# Patient Record
Sex: FEMALE | Race: WHITE
[De-identification: names, ages, dates, MRNs, and addresses within clinical notes are randomized per-mention and may not be internally consistent; named-entity substitution may affect disease eponyms.]

---

## 2017-04-30 ENCOUNTER — HOSPITAL ENCOUNTER (INPATIENT)
Dept: HOSPITAL 59 - ER | Age: 82
LOS: 3 days | Discharge: HOME HEALTH | DRG: 690 | End: 2017-05-03
Attending: FAMILY MEDICINE | Admitting: FAMILY MEDICINE
Payer: MEDICARE

## 2017-04-30 DIAGNOSIS — R53.1: ICD-10-CM

## 2017-04-30 DIAGNOSIS — N39.0: Primary | ICD-10-CM

## 2017-04-30 DIAGNOSIS — Z85.038: ICD-10-CM

## 2017-04-30 DIAGNOSIS — Z85.01: ICD-10-CM

## 2017-04-30 DIAGNOSIS — S00.469A: ICD-10-CM

## 2017-04-30 DIAGNOSIS — E86.0: ICD-10-CM

## 2017-04-30 DIAGNOSIS — Z78.9: ICD-10-CM

## 2017-04-30 LAB
ALBUMIN SERPL-MCNC: 3.8 GM/DL (ref 3.5–5)
ALBUMIN/GLOB SERPL: 1 {RATIO} (ref 1.1–1.8)
ALP SERPL-CCNC: 147 U/L (ref 38–126)
ALT SERPL-CCNC: 45 U/L (ref 9–52)
ANION GAP SERPL CALC-SCNC: 14.9 MMOL/L (ref 7–16)
APPEARANCE UR: (no result)
AST SERPL-CCNC: 98 U/L (ref 14–36)
BACTERIA #/AREA URNS HPF: (no result) /[HPF]
BASOPHILS NFR BLD: 0.1 % (ref 0–6)
BILIRUB SERPL-MCNC: 1.02 MG/DL (ref 0.2–1.3)
BILIRUB UR-MCNC: NEGATIVE MG/DL
BUN SERPL-MCNC: 31 MG/DL (ref 7–17)
CO2 SERPL-SCNC: 24.1 MMOL/L (ref 22–30)
COLOR UR: YELLOW
CREAT SERPL-MCNC: 1.3 MG/DL (ref 0.52–1.04)
EOSINOPHIL NFR BLD: 0.6 % (ref 0–6)
EPI CELLS #/AREA URNS HPF: (no result) /[HPF]
ERYTHROCYTE [DISTWIDTH] IN BLOOD BY AUTOMATED COUNT: 13.8 % (ref 11.5–14.5)
EST GLOMERULAR FILTRATION RATE: 42 ML/MIN
GLOBULIN SER-MCNC: 4 GM/DL (ref 1.4–4.8)
GLUCOSE SERPL-MCNC: 129 MG/DL (ref 70–110)
GLUCOSE UR STRIP-MCNC: NEGATIVE MG/DL
GRANULOCYTES NFR BLD: 73.3 % (ref 47–80)
HCT VFR BLD CALC: 38.2 % (ref 35–47)
HGB BLD-MCNC: 12.1 GM/DL (ref 11.6–16)
KETONES UR QL STRIP: NEGATIVE
LYMPHOCYTES NFR BLD AUTO: 12.9 % (ref 16–45)
MCH RBC QN AUTO: 30 PG (ref 27–33)
MCHC RBC AUTO-ENTMCNC: 31.7 G/DL (ref 32–36)
MCV RBC AUTO: 94.6 FL (ref 81–97)
MONOCYTES NFR BLD: 13.1 % (ref 0–9)
NITRITE UR QL STRIP: NEGATIVE
PLATELET # BLD: 374 K/UL (ref 130–400)
PMV BLD AUTO: 8.9 FL (ref 7.4–10.4)
PROT SERPL-MCNC: 7.8 GM/DL (ref 6.3–8.2)
RBC # BLD AUTO: 4.04 M/UL (ref 3.8–5.4)
RBC # UR STRIP: (no result) /UL
RBC #/AREA URNS HPF: >50 /[HPF]
TSH SERPL-ACNC: 4.4 UIU/ML (ref 0.47–4.68)
URINE LEUKOCYTE ESTERASE: (no result)
UROBILINOGEN UR STRIP-ACNC: 1 E.U./DL (ref 0.2–1)
WBC # BLD AUTO: 9 K/UL (ref 4.2–12.2)
WBC #/AREA URNS HPF: >50 /[HPF]

## 2017-04-30 PROCEDURE — 93010 ELECTROCARDIOGRAM REPORT: CPT

## 2017-04-30 PROCEDURE — 70450 CT HEAD/BRAIN W/O DYE: CPT

## 2017-04-30 PROCEDURE — 97535 SELF CARE MNGMENT TRAINING: CPT

## 2017-04-30 PROCEDURE — 85651 RBC SED RATE NONAUTOMATED: CPT

## 2017-04-30 PROCEDURE — 97165 OT EVAL LOW COMPLEX 30 MIN: CPT

## 2017-04-30 PROCEDURE — 80162 ASSAY OF DIGOXIN TOTAL: CPT

## 2017-04-30 PROCEDURE — 81001 URINALYSIS AUTO W/SCOPE: CPT

## 2017-04-30 PROCEDURE — 99223 1ST HOSP IP/OBS HIGH 75: CPT

## 2017-04-30 PROCEDURE — 99233 SBSQ HOSP IP/OBS HIGH 50: CPT

## 2017-04-30 PROCEDURE — 84443 ASSAY THYROID STIM HORMONE: CPT

## 2017-04-30 PROCEDURE — 71020: CPT

## 2017-04-30 PROCEDURE — 96366 THER/PROPH/DIAG IV INF ADDON: CPT

## 2017-04-30 PROCEDURE — 82306 VITAMIN D 25 HYDROXY: CPT

## 2017-04-30 PROCEDURE — 96365 THER/PROPH/DIAG IV INF INIT: CPT

## 2017-04-30 PROCEDURE — 86140 C-REACTIVE PROTEIN: CPT

## 2017-04-30 PROCEDURE — 99285 EMERGENCY DEPT VISIT HI MDM: CPT

## 2017-04-30 PROCEDURE — 99239 HOSP IP/OBS DSCHRG MGMT >30: CPT

## 2017-04-30 PROCEDURE — 97530 THERAPEUTIC ACTIVITIES: CPT

## 2017-04-30 PROCEDURE — 93005 ELECTROCARDIOGRAM TRACING: CPT

## 2017-04-30 PROCEDURE — 85025 COMPLETE CBC W/AUTO DIFF WBC: CPT

## 2017-04-30 PROCEDURE — 80053 COMPREHEN METABOLIC PANEL: CPT

## 2017-04-30 NOTE — EMERGENCY DEPARTMENT RECORD
History of Present Illness





- General


Chief complaint: Weakness


Stated complaint: WEAK


Time Seen by Provider: 17 17:15


Source: Patient, Family, EMS


Mode of Arrival: EMS


Limitations: No limitations





- History of Present Illness


Initial comments: 


80 yo female presents with weakness for about two weeks since the death of her 

sister.  She has been very fatigued, sleepy, less appetite, generally feeling 

slowed and tired.  No headaches.  She did get lightheaded today with standing.  

No vomiting or diarrhea.  No chest pain or shortness of breath.  No abdominal 

pain.  No swelling or edema.  No rash.  No speech changes.  No focal weakness.  

No new medications.  She did see her doctor recently and was diagnosed with 

vitamin D deficiency.  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





MD Complaint: Generalized weakness


Onset/Timin


-: Hour(s)


Location: Generalized


Consistency: Constant


Improves with: None


Worsens with: Exertion


Context: Depression (sister  2 weeks ago)


Associated Symptoms: Denies other symptoms





- Brian Coma Scale


Eye Response: (4) Open spontaneously


Motor Response: (6) Obeys commands


Verbal Response: (5) Oriented


[place] Total: 15





- Related Data


 Home Medications











 Medication  Instructions  Recorded  Confirmed  Last Taken


 


Atenolol [Tenormin] 25 mg PO DAILY 17


 


Atenolol [Tenormin] 50 mg PO DAILY 17


 


Digoxin [Lanoxin] 125 mcg PO DAILY 17


 


Ergocalciferol (Vitamin D2) 50,000 unit PO WEEKLY 17 Unknown





[Vitamin D2]    


 


Furosemide [Lasix] 20 mg PO DAILY 17


 


Potassium Chloride [K-Tab ER] 10 meq PO DAILY 17


 


Pravastatin Sodium [Pravachol] 10 mg PO DAILY 17











 Allergies











Allergy/AdvReac Type Severity Reaction Status Date / Time


 


cephalexin monohydrate Allergy  HIVES Verified 17 17:16





[From Keflex]     














Travel Screening





- Travel/Exposure Within Last 30 Days


Have you traveled within the last 30 days?: No





- Travel/Exposure Within Last Year


Have you traveled outside the [place] in the last year?: No





- Additonal Travel Details


Have you been exposed to anyone with a communicable illness?: No





- Travel Symptoms


Symptom Screening: None





Review of Systems


Constitutional: Reports: Malaise, Weakness.  Denies: Chills, Fever, Night sweats

, Weight change


Eyes: Denies: Eye discharge, Eye pain, Photophobia, Vision change


ENT: Denies: Congestion, Epistaxis, Throat pain


Respiratory: Denies: Cough, Dyspnea, Hemoptysis, Wheezes


Cardiovascular: Denies: Arrhythmia, Chest pain, Dyspnea on exertion, Edema, 

Palpitations, Syncope


Endocrine: Reports: Fatigue.  Denies: Heat or cold intolerance, Polydipsia, 

Polyuria


Gastrointestinal: Reports: As per HPI.  Denies: Abdominal pain, Diarrhea, Nausea

, Vomiting


Genitourinary: Denies: Dysuria, Frequency, Hematuria, Urgency


Musculoskeletal: Denies: Arthralgia, Back pain, Joint swelling, Myalgia, Neck 

pain


Skin: Denies: Bruising, Change in color, Rash


Neurological: Reports: Weakness.  Denies: Abnormal gait, Confusion, Headache, 

Numbness, Paresthesias, Tingling, Tremors, Vertigo


Psychiatric: Reports: Depression.  Denies: Anxiety


Hematological/Lymphatic: Denies: Blood Clots, Easy bleeding, Easy bruising, 

Swollen glands





Past Medical History





- SOCIAL HISTORY


Smoking Status: Never smoker


Alcohol Use: None


Drug Use: None





- RESPIRATORY


Hx Respiratory Disorders: No





- CARDIOVASCULAR


Hx Cardio Disorders: No





- NEURO


Hx Neuro Disorders: No





- GI


Hx GI Disorders: No





- 


Hx Genitourinary Disorders: No





- ENDOCRINE


Hx Endocrine Disorders: No





- MUSCULOSKELETAL


Hx Musculoskeletal Disorders: No





- PSYCH


Hx Psych Problems: No





- HEMATOLOGY/ONCOLOGY


Hx Hematology/Oncology Disorders: Yes


Hx Cancer: Yes (colon)





Family Medical History


Any Significant Family History?: No





Physical Exam





- General


General Appearance: Alert, Oriented x3, Cooperative, No acute distress


Limitations: No limitations, Other (the patient is the primary historian)





- Head


Head exam: Atraumatic, Normal inspection





- Eye


Eye exam: Normal appearance, PERRL, EOMI.  negative: Conjunctival injection, 

Periorbital swelling





- ENT


ENT exam: Normal exam, Mucous membranes moist, Normal external ear exam, Normal 

orophraynx


Ear exam: Normal external inspection.  negative: External canal tenderness


Nasal Exam: Normal inspection.  negative: Discharge, Sinus tenderness


Mouth exam: Normal external inspection, Tongue normal


Teeth exam: Normal inspection.  negative: Dental caries


Throat exam: Normal inspection.  negative: Tonsillar erythema, Tonsillar exudate





- Neck


Neck exam: Normal inspection, Full ROM.  negative: Lymphadenopathy, Tenderness, 

Thyromegaly





- Respiratory


Respiratory exam: Normal lung sounds bilaterally.  negative: Respiratory 

distress





- Cardiovascular


Cardiovascular Exam: Regular rate, Normal rhythm, Normal heart sounds


Peripheral Pulses: 2+: Radial (R), Radial (L)





- GI/Abdominal


GI/Abdominal exam: Soft





- Rectal


Rectal exam: Deferred





- 


 exam: Deferred





- Extremities


Extremities exam: Normal inspection, Full ROM, Normal capillary refill.  

negative: Tenderness





- Back


Back exam: Reports: Normal inspection, Full ROM.  Denies: Muscle spasm, Rash 

noted, Tenderness





- Neurological


Neurological exam: Alert, CN II-XII intact, Normal gait, Oriented X3.  negative

: Altered, Motor sensory deficit





- Psychiatric


Psychiatric exam: Normal affect, Normal mood.  negative: Agitated, Anxious, 

Depressed





- Skin


Skin exam: Dry, Intact, Normal color, Warm





Course





 Vital Signs











  17





  17:08


 


Temperature 97.7 F


 


Pulse Rate [ 95 H





Pulse Ox Probe] 


 


Respiratory 18





Rate 


 


Blood Pressure 124/64





[Left Arm] 


 


Pulse Ox 98














- Reevaluation(s)


Reevaluation #1: 


No acute findings on the HCT scan


CBC is unremarkable.  No acute changes


EKG NSR, rate 87, intervals normal, axis leftward, ST no acute changes





17 18:16





Reevaluation #2: 


BUn and CR elevated at 31 and 1.3 likely dehydration as she has not been eating 

or drinking normally since her sisters death.


17 18:31








Medical Decision Making





- Lab Data


Result diagrams: 


 17 17:30





 17 06:45





Disposition


Clinical Impression: 


 Dehydration, UTI (urinary tract infection), Generalized weakness


Disposition: Still a Patient at [facility]


Condition: (1) Good

## 2017-04-30 NOTE — EMERGENCY DEPARTMENT RECORD
History of Present Illness





- General


Chief complaint: Weakness


Stated complaint: WEAK


Time Seen by Provider: 17 17:15


Source: Patient, Family, EMS


Mode of Arrival: EMS


Limitations: No limitations, Other (the patient is the primary historian)





- History of Present Illness


MD Complaint: Generalized weakness


Onset/Timin


-: Hour(s)


Location: Generalized


Consistency: Constant


Improves with: None


Worsens with: Exertion


Context: Depression (sister  2 weeks ago)


Associated Symptoms: Denies other symptoms





- Brian Coma Scale


Eye Response: (4) Open spontaneously


Motor Response: (6) Obeys commands


Verbal Response: (5) Oriented


[place] Total: 15





- Related Data


 Home Medications











 Medication  Instructions  Recorded  Confirmed  Last Taken


 


Atenolol [Tenormin] 25 mg PO DAILY 17


 


Atenolol [Tenormin] 50 mg PO DAILY 17


 


Digoxin [Lanoxin] 125 mcg PO DAILY 17


 


Ergocalciferol (Vitamin D2) 50,000 unit PO WEEKLY 17 Unknown





[Vitamin D2]    


 


Furosemide [Lasix] 20 mg PO DAILY 17


 


Potassium Chloride [K-Tab ER] 10 meq PO DAILY 17


 


Pravastatin Sodium [Pravachol] 10 mg PO DAILY 17











 Allergies











Allergy/AdvReac Type Severity Reaction Status Date / Time


 


cephalexin monohydrate Allergy  HIVES Verified 17 17:16





[From Keflex]     














Travel Screening





- Travel/Exposure Within Last 30 Days


Have you traveled within the last 30 days?: No





- Travel/Exposure Within Last Year


Have you traveled outside the [place] in the last year?: No





- Additonal Travel Details


Have you been exposed to anyone with a communicable illness?: No





- Travel Symptoms


Symptom Screening: None





Review of Systems


Constitutional: Reports: Malaise, Weakness.  Denies: Chills, Fever, Night sweats

, Weight change


Eyes: Denies: Eye discharge, Eye pain, Photophobia, Vision change


ENT: Denies: Congestion, Epistaxis, Throat pain


Respiratory: Denies: Cough, Dyspnea, Hemoptysis, Wheezes


Cardiovascular: Denies: Arrhythmia, Chest pain, Dyspnea on exertion, Edema, 

Palpitations, Syncope


Endocrine: Reports: Fatigue.  Denies: Heat or cold intolerance, Polydipsia, 

Polyuria


Gastrointestinal: Reports: As per HPI.  Denies: Abdominal pain, Diarrhea, Nausea

, Vomiting


Genitourinary: Denies: Dysuria, Frequency, Hematuria, Urgency


Musculoskeletal: Denies: Arthralgia, Back pain, Joint swelling, Myalgia, Neck 

pain


Skin: Denies: Bruising, Change in color, Rash


Neurological: Reports: Weakness.  Denies: Abnormal gait, Confusion, Headache, 

Numbness, Paresthesias, Tingling, Tremors, Vertigo


Psychiatric: Reports: Depression.  Denies: Anxiety


Hematological/Lymphatic: Denies: Blood Clots, Easy bleeding, Easy bruising, 

Swollen glands





Past Medical History





- SOCIAL HISTORY


Smoking Status: Never smoker


Alcohol Use: None


Drug Use: None





- RESPIRATORY


Hx Respiratory Disorders: No





- CARDIOVASCULAR


Hx Cardio Disorders: No





- NEURO


Hx Neuro Disorders: No





- GI


Hx GI Disorders: No





- 


Hx Genitourinary Disorders: No





- ENDOCRINE


Hx Endocrine Disorders: No





- MUSCULOSKELETAL


Hx Musculoskeletal Disorders: No





- PSYCH


Hx Psych Problems: No





- HEMATOLOGY/ONCOLOGY


Hx Hematology/Oncology Disorders: Yes


Hx Cancer: Yes (colon)





Family Medical History


Any Significant Family History?: No





Physical Exam





- General


Limitations: No limitations, Other (the patient is the primary historian)





Course





 Vital Signs











  17





  17:08 18:15


 


Temperature 97.7 F 


 


Pulse Rate [ 95 H 89





Pulse Ox Probe]  


 


Respiratory 18 16





Rate  


 


Blood Pressure 124/64 120/51





[Left Arm]  


 


Pulse Ox 98 98














- Reevaluation(s)


Reevaluation #1: 





17 20:58


UA reviewed and appears c/w UTI.  Rocephin ordered and has infused, attempted 

ambulation trial and patient ambulated with assist to the bathroom from Room #

4.  Patient and daughter however are not comfortable with the patient going 

home at this time, will admit for observation, IVFs, and antibiotics with 

consultation for PT/OT evaluation in the morning.  Case was discussed with 

Admitting PA (Yamile), will accept admission at this time.





Medical Decision Making





- Lab Data


Result diagrams: 


 17 17:30





 17 17:30





 Lab Results











  17 Range/Units





  17:30 17:30 17:30 


 


WBC  9.0    (4.2-12.2)  K/uL


 


RBC  4.04    (3.80-5.40)  M/uL


 


Hgb  12.1    (11.6-16.0)  gm/dl


 


Hct  38.2    (35.0-47.0)  %


 


MCV  94.6    (81-97)  fl


 


MCH  30.0    (27-33)  pg


 


MCHC  31.7 L    (32-36)  g/dl


 


RDW  13.8    (11.5-14.5)  %


 


Plt Count  374    (130-400)  K/uL


 


MPV  8.9    (7.4-10.4)  fl


 


Gran %  73.3    (47-80)  %


 


Lymphocytes %  12.9 L    (16-45)  %


 


Monocytes %  13.1 H    (0-9)  %


 


Eosinophils %  0.6    (0-6)  %


 


Basophils %  0.1    (0-6)  %


 


Sodium   135 L   (136-145)  mmol/L


 


Potassium   3.9   (3.5-5.1)  mmol/L


 


Chloride   96 L   ()  mmol/L


 


Carbon Dioxide   24.1   (22-30)  mmol/L


 


Anion Gap   14.9   (7-16)  


 


BUN   31 H   (7-17)  mg/dL


 


Creatinine   1.3 H   (0.52-1.04)  mg/dL


 


Estimated GFR   42   ml/min


 


Random Glucose   129 H   ()  mg/dL


 


Calcium   8.7   (8.5-10.1)  mg/dL


 


Total Bilirubin   1.02   (0.2-1.3)  mg/dL


 


AST   98 H   (14-36)  U/L


 


ALT   45   (9-52)  U/L


 


Alkaline Phosphatase   147 H   ()  U/L


 


Total Protein   7.8   (6.3-8.2)  gm/dL


 


Albumin   3.8   (3.5-5.0)  gm/dL


 


Globulin   4.0   (1.4-4.8)  gm/dL


 


Albumin/Globulin Ratio   1.0 L   (1.1-1.8)  


 


TSH   4.40   (0.465-4.68)  uIU/ml


 


Urine Color     


 


Urine Appearance     


 


Urine pH     (5.0-8.0)  


 


Ur Specific Gravity     (1.002-1.030)  


 


Urine Protein     (NEGATIVE)  


 


Urine Glucose (UA)     (NEGATIVE)  


 


Urine Ketones     (NEGATIVE)  


 


Urine Blood     (NEGATIVE)  


 


Urine Nitrite     (NEGATIVE)  


 


Urine Bilirubin     (NEGATIVE)  


 


Urine Urobilinogen     (0.20 - 1.00)  E.U./dL


 


Ur Leukocyte Esterase     (NEGATIVE)  


 


Urine RBC     (NONE SEEN)  


 


Urine WBC     (0-2/hpf)  


 


Ur Epithelial Cells     (FEW)  


 


Urine Bacteria     


 


Digoxin    < 0.40 L  (0.9-2.0)  ng/mL














  17 Range/Units





  19:00 


 


WBC   (4.2-12.2)  K/uL


 


RBC   (3.80-5.40)  M/uL


 


Hgb   (11.6-16.0)  gm/dl


 


Hct   (35.0-47.0)  %


 


MCV   (81-97)  fl


 


MCH   (27-33)  pg


 


MCHC   (32-36)  g/dl


 


RDW   (11.5-14.5)  %


 


Plt Count   (130-400)  K/uL


 


MPV   (7.4-10.4)  fl


 


Gran %   (47-80)  %


 


Lymphocytes %   (16-45)  %


 


Monocytes %   (0-9)  %


 


Eosinophils %   (0-6)  %


 


Basophils %   (0-6)  %


 


Sodium   (136-145)  mmol/L


 


Potassium   (3.5-5.1)  mmol/L


 


Chloride   ()  mmol/L


 


Carbon Dioxide   (22-30)  mmol/L


 


Anion Gap   (7-16)  


 


BUN   (7-17)  mg/dL


 


Creatinine   (0.52-1.04)  mg/dL


 


Estimated GFR   ml/min


 


Random Glucose   ()  mg/dL


 


Calcium   (8.5-10.1)  mg/dL


 


Total Bilirubin   (0.2-1.3)  mg/dL


 


AST   (14-36)  U/L


 


ALT   (9-52)  U/L


 


Alkaline Phosphatase   ()  U/L


 


Total Protein   (6.3-8.2)  gm/dL


 


Albumin   (3.5-5.0)  gm/dL


 


Globulin   (1.4-4.8)  gm/dL


 


Albumin/Globulin Ratio   (1.1-1.8)  


 


TSH   (0.465-4.68)  uIU/ml


 


Urine Color  Yellow  


 


Urine Appearance  Cloudy  


 


Urine pH  5.5  (5.0-8.0)  


 


Ur Specific Gravity  1.015  (1.002-1.030)  


 


Urine Protein  30 mg/dl H  (NEGATIVE)  


 


Urine Glucose (UA)  Negative  (NEGATIVE)  


 


Urine Ketones  Negative  (NEGATIVE)  


 


Urine Blood  Large H  (NEGATIVE)  


 


Urine Nitrite  Negative  (NEGATIVE)  


 


Urine Bilirubin  Negative  (NEGATIVE)  


 


Urine Urobilinogen  1.0  (0.20 - 1.00)  E.U./dL


 


Ur Leukocyte Esterase  Large H  (NEGATIVE)  


 


Urine RBC  >50  (NONE SEEN)  


 


Urine WBC  >50  (0-2/hpf)  


 


Ur Epithelial Cells  0 - 2  (FEW)  


 


Urine Bacteria  1+  


 


Digoxin   (0.9-2.0)  ng/mL














Disposition


Disposition: Admit


Clinical Impression: 


 Dehydration, Weakness





Urinary Tract Infection


Qualifiers:


 Urinary tract infection type: acute cystitis Hematuria presence: without 

hematuria Qualified Code(s): N30.00 - Acute cystitis without hematuria


Disposition: Still a Patient at [facility]


Decision to Admit: Admit from ER


Decision to Admit Date: 17


Decision to Admit Time: 21:01


Condition: (2) Stable


Instructions:  Dehydration (ED)


Additional Instructions: 


You must stay well hydrated over the next few days


Return if worse, fever, vomiting, not eating or drinking


You will need to call your doctor this week


Forms:  Patient Portal Access


Time of Disposition: 21:01

## 2017-05-01 LAB
ALBUMIN SERPL-MCNC: 3.4 GM/DL (ref 3.5–5)
ALBUMIN/GLOB SERPL: 0.9 {RATIO} (ref 1.1–1.8)
ALP SERPL-CCNC: 143 U/L (ref 38–126)
ALT SERPL-CCNC: 49 U/L (ref 9–52)
ANION GAP SERPL CALC-SCNC: 13 MMOL/L (ref 7–16)
AST SERPL-CCNC: 90 U/L (ref 14–36)
BILIRUB SERPL-MCNC: 0.91 MG/DL (ref 0.2–1.3)
BUN SERPL-MCNC: 23 MG/DL (ref 7–17)
CO2 SERPL-SCNC: 21 MMOL/L (ref 22–30)
CREAT SERPL-MCNC: 1 MG/DL (ref 0.52–1.04)
EST GLOMERULAR FILTRATION RATE: 57 ML/MIN
GLOBULIN SER-MCNC: 3.7 GM/DL (ref 1.4–4.8)
GLUCOSE SERPL-MCNC: 105 MG/DL (ref 70–110)
PROT SERPL-MCNC: 7.1 GM/DL (ref 6.3–8.2)

## 2017-05-01 RX ADMIN — FUROSEMIDE SCH: 20 TABLET ORAL at 11:01

## 2017-05-01 RX ADMIN — SIMVASTATIN SCH MG: 5 TABLET, FILM COATED ORAL at 21:57

## 2017-05-01 RX ADMIN — ATENOLOL SCH: 25 TABLET ORAL at 11:02

## 2017-05-01 RX ADMIN — ATENOLOL SCH: 50 TABLET ORAL at 11:02

## 2017-05-01 RX ADMIN — ENOXAPARIN SODIUM SCH MG: 40 INJECTION SUBCUTANEOUS at 11:02

## 2017-05-01 RX ADMIN — POTASSIUM CHLORIDE SCH MEQ: 750 TABLET, FILM COATED, EXTENDED RELEASE ORAL at 11:01

## 2017-05-01 RX ADMIN — CEFTRIAXONE SCH MLS/HR: 2 INJECTION, POWDER, FOR SOLUTION INTRAMUSCULAR; INTRAVENOUS at 11:02

## 2017-05-01 RX ADMIN — DIGOXIN SCH MCG: 125 TABLET ORAL at 11:01

## 2017-05-01 NOTE — REHAB EVALUATION
Patient Information





- Patient Information


Diagnosis: Dehydration, generalized weakness, UTI


Ordered Treatment: OT Evaluate and Treat


Status: Initial Evaluation


Surgery: No


History: Detail (The patient presented into ED with complaints of weakness for 

approx. 2 weeks.  The patient was admitted onto inpatient floor.)


Past Medical/Surgical Hx: 


 PAST MEDICAL/SURGICAL HISTORY





Past Surgical History            removal of colon ca


                                 





PMH - Respiratory





Hx Respiratory Disorders         No


Comment:                         PATIENT IS POOR HISTORIAN





PMH - Cardiovascular





Hx Cardiovascular Disorders      No


Hx Irregular Heartbeat           Yes: patient reports this was in the distant


                                 past


Comment:                         PATIENT IS POOR HISTORIAN





PMH - Neuro





Hx Neurological Disorders        No


Comment:                         PATIENT IS POOR HISTORIAN





PMH - GI





Hx Gastrointestinal Disorders    No


Comment:                         PATIENT IS POOR HISTORIAN





PMH - 





Hx Genitourinary Disorders       No


Patient Pregnant                 


Comment:                         PATIENT IS POOR HISTORIAN





PMH - Endocrine





Hx Endocrine Disorders           No


Comment:                         PATIENT IS POOR HISTORIAN





PMH - Musculoskeletal





Hx Musculoskeletal Disorders     No


Comment:                         PATIENT IS POOR HISTORIAN





PMH - Psych





Hx Psychiatric Problems          No


Hx Depression                    Yes: since death of sister 2 weeks ago


Comment:                         PATIENT IS POOR HISTORIAN





PMH - Hematology/Oncology





Hx Hematology/Oncology           Yes


Disorders                        


Hx Cancer                        Yes: colon


Hx Radiation Therapy             Yes


Comment:                         PATIENT IS POOR HISTORIAN








Premorbid Status: Detail (Prior to admission the patient was independent with 

ambulation without device, however she did use a standard cane at times.)


Social History: Detail (The patient lives with spouse in a one story home with 

a basement. She generally stays on the first floor.  She has a ramp at the 

entrance.  She has a walk-in shower with a grab bar and normally stands to 

shower.  She has an elevated toilet.  Prior to admission she was Ind with all 

ADLs, and IADLs including driving.  She was not responsible for yard work.)


Precautions: Universal, Fall





- Time With Patient


Total Time Spent With Patient (Min): 30


Treatment Procedures: Detail (OT eval low complexity)





Subjective Information





- Subjective Information


Per Patient





Objective Data





- Pain


Pain Present: No





- Mental Status


Patient Orientation: Person, Place (Pt oriented to [facility], states age as 80, unsure of month and states year as "2016".)





- Visual Perception


Appears within normal limits for therapeutic activities (Pt reports she wears 

glasses most of the time.)





- ROM


Within normal limits (Chao UE AROM WNL.)





- Strength/Tone


Within normal limits (Chao UE MMT 4+/5 throughout.)





- Coordination


Appears within normal limits for therapeutic activities





- Bed Mobility


Independent (Ind with sit to supine.)





- Transfers


Independent





- Balance


Balance Sitting: Good


Balance Standing: Fair





- Sensation


Intact





- Gait


Detail (Pt ambulated in hallway with 2 wheeled walker with CG assist. Pt very 

fatigued after ambulation.)





- ADL's/IADL's


Detail (Pt reports she is able to toilet with supervision from nursing.  She 

donned briefs with min assist to start over left foot.  Additional ADLs not 

formally assessed at this time.)





Therapy Assessment





- Therapy Assessment


Detail (Pt presents with decreased endurance needed for safe and Ind ADLs and 

functional mobility.)





Problem List





- Problem List


Occupational Therapy Problem List: Detail (1.  Decreased Ind with self care 

activities.  2.  Decreased endurance needed for safe and Ind showering/dressing.

)





Goals





- Goals


Occupational Therapy Goals: 1.  Pt will be Ind with showering.  2.  Pt will be 

Ind with total body dressing.  3.  Pt will demonstrate improved endurance 

needed for safe and Ind self cares.





Prognosis





- Prognosis


Good





Plan





- Plan


Occupational Therapy Plan: OT 1-4 times weekly to address self cares, 

functional mobility, endurance and safety to allow safe and Ind return home.

## 2017-05-01 NOTE — REHAB EVALUATION
Patient Information





- Patient Information


Diagnosis: Dehydration, generalized weakness


Ordered Treatment: PT Evaluate and Treat


Status: Initial Evaluation


History: Detail (The patient presented into ED with complaints of weakness for 

aprox. 2 weeks.  The patient was admitted onto inpatient floor.)


Past Medical/Surgical Hx: 


 PAST MEDICAL/SURGICAL HISTORY





Past Surgical History            removal of colon ca


                                 





PMH - Respiratory





Hx Respiratory Disorders         No


Comment:                         PATIENT IS POOR HISTORIAN





PMH - Cardiovascular





Hx Cardiovascular Disorders      No


Hx Irregular Heartbeat           Yes: patient reports this was in the distant


                                 past


Comment:                         PATIENT IS POOR HISTORIAN





PMH - Neuro





Hx Neurological Disorders        No


Comment:                         PATIENT IS POOR HISTORIAN





PMH - GI





Hx Gastrointestinal Disorders    No


Comment:                         PATIENT IS POOR HISTORIAN





PMH - 





Hx Genitourinary Disorders       No


Patient Pregnant                 


Comment:                         PATIENT IS POOR HISTORIAN





PMH - Endocrine





Hx Endocrine Disorders           No


Comment:                         PATIENT IS POOR HISTORIAN





PMH - Musculoskeletal





Hx Musculoskeletal Disorders     No


Comment:                         PATIENT IS POOR HISTORIAN





PMH - Psych





Hx Psychiatric Problems          No


Hx Depression                    Yes: since death of sister 2 weeks ago


Comment:                         PATIENT IS POOR HISTORIAN





PMH - Hematology/Oncology





Hx Hematology/Oncology           Yes


Disorders                        


Hx Cancer                        Yes: colon


Hx Radiation Therapy             Yes


Comment:                         PATIENT IS POOR HISTORIAN








Premorbid Status: Detail (Prior to admission the patient was independent with 

ambulation without device, however she did use a standard cane at times.)


Social History: Detail (The patient lives in a one story home with a basement 

with . The patient's home has a ramp at the enterance and a bathroom 

with a walkin in shower without a chair.  The patient's home has an elevated 

toilet with grab bars.)


Precautions: Universal, Fall





- Time With Patient


Total Time Spent With Patient (Min): 30


Treatment Procedures: Detail (PT Initial Evaluation)





Subjective Information





- Subjective Information


Per Patient (The patient had no complaints of pain but did complain of feeling 

weak.)





Objective Data





- Mental Status


Patient Orientation: Person, Place, Time (The patient identified her birthday 

but not age and did not know month and year.)





- Visual Perception


Appears within normal limits for therapeutic activities





- ROM


Within normal limits (The patient's LE AROM is WNL.  Refer to OT evaluation  

for UE ROM.)





- Strength/Tone


Not within normal limits (The patient's hip flexors were 4/5, hip abductors, 

adductors 4+/5, Quads 4+/5, hamstrings 4/5, ankle musculature 4+/5. Refer to OT 

note for UE strength grades.)





- Bed Mobility


Independent (The patient was independent with supine to and from sit transfer.)





- Transfers


Independent (The patient was independent with sit to and from stand transfer)





- Balance


Balance Sitting: Good


Balance Standing: Fair (The patient's balance using the Tinetti Assessment Tool 

is 19/28 which is in the moderate for risk for falling category.)





- Gait


Detail (The patient ambulated 5 feet with CG of 2 without device. Ambulating 

without device the patient exhibited increased postural sway.  The patient 

ambulated with wheeled walker with Cg/supervision for safety a distance of 65 

feet x 1.)





Therapy Assessment





- Therapy Assessment


Detail (The patient exhibits decreased balance, LE weakness, and unsteadiness 

with gait.  Recommend use of wheeled walker for ambulation.  Feel the patient 

would benefit from inpatient rehab to return to previous functional level. 

Recommend ongoing home health Rehab upon discharge from [facility].)





Problem List





- Problem List


Physical Therapy Problem List: Detail (1) Instability with ambulation 2) 

Decreased LE strength 3) Decreased Balance as measured by the Tinetti 

Assessment Tool 4) Decreased ability to complete sustained physical activity)





Goals





- Goals


Physical Therapy Goals: 1) The patient will be independent with ambulation with 

assistive device household and community distances. 2) Increase LE strength 1/3 

muscle grade  in weakened muscles to increase stability of gait. 3) Improve 

balance 3 to 4 points on the Tinetti Asessment Tool. 4) The patient will 

tolerate 30 minutes of physical activity with one to two period.





Prognosis





- Prognosis


Good





Plan





- Plan


Physical Therapy Plan: PT once daily for gait training, transfer training, LE 

strengthening and balance exercises. Ongoing PT is recommended upon discharge 

from hospital and wheeled walker.

## 2017-05-01 NOTE — CT SCAN REPORT
EXAM:  CT OF THE BRAIN  



HISTORY:  DIZZINESS.  



TECHNIQUE:  CT of the brain without contrast was obtained.  



Comparison:  Prior CT of the brain from 8/09/11.  



FINDINGS:  The globes are intact.  The paranasal sinuses and mastoid air cells 
are unremarkable.  No displaced or depressed skull fracture.  No intra or 
extraaxial hemorrhage.  CT is limited for evaluation of acute infarct.  No CT 
evidence for large or territorial acute infarct.  No mass or midline shift.  
Age appropriate atrophy with small vessel ischemic change.  



IMPRESSION:  

ATROPHY.  SMALL VESSEL ISCHEMIC CHANGE.  



JOB NUMBER:  [id number]
[facility]

## 2017-05-02 RX ADMIN — ATENOLOL SCH: 50 TABLET ORAL at 11:19

## 2017-05-02 RX ADMIN — SIMVASTATIN SCH MG: 5 TABLET, FILM COATED ORAL at 23:07

## 2017-05-02 RX ADMIN — ATENOLOL SCH MG: 25 TABLET ORAL at 10:52

## 2017-05-02 RX ADMIN — VITAMIN D, TAB 1000IU (100/BT) SCH UNIT: 25 TAB at 10:53

## 2017-05-02 RX ADMIN — DIGOXIN SCH MCG: 125 TABLET ORAL at 10:51

## 2017-05-02 RX ADMIN — ENOXAPARIN SODIUM SCH MG: 40 INJECTION SUBCUTANEOUS at 10:52

## 2017-05-02 RX ADMIN — FUROSEMIDE SCH: 20 TABLET ORAL at 11:17

## 2017-05-02 RX ADMIN — POTASSIUM CHLORIDE SCH MEQ: 750 TABLET, FILM COATED, EXTENDED RELEASE ORAL at 10:53

## 2017-05-02 RX ADMIN — ATENOLOL SCH MG: 50 TABLET ORAL at 10:52

## 2017-05-02 RX ADMIN — CEFTRIAXONE SCH MLS/HR: 2 INJECTION, POWDER, FOR SOLUTION INTRAMUSCULAR; INTRAVENOUS at 10:49

## 2017-05-02 RX ADMIN — FUROSEMIDE SCH MG: 20 TABLET ORAL at 10:52

## 2017-05-02 NOTE — PHYSICAL THERAPY TX NOTE
Physical Therapy Tx Note





- Treatment Note


Tolerated: Good


Total Time Spent With Patient: 20


Physical Therapy Tx Note: Detail (The patient was in bed when PT arrived.  The 

patient was fitted for a wheeled walker. The patient ambulated with wheeled 

walker a distance of 40 feet x 1, then to bathroom.  The patient required 

minimal assist to put on new undergarment. The patient returned to bed and was 

independent with supine to and from sit.  Call light was placed within reach.)


Physical Therapy Problem List: Detail (1) Instability with ambulation 2) 

Decreased LE strength 3) Decreased Balance as measured by the Tinetti 

Assessment Tool 4) Decreased ability to complete sustained physical activity)


Physical Therapy Goals: 1) The patient will be independent with ambulation with 

assistive device household and community distances. 2) Increase LE strength 1/3 

muscle grade  in weakened muscles to increase stability of gait. 3) Improve 

balance 3 to 4 points on the Tinetti Asessment Tool. 4) The patient will 

tolerate 30 minutes of physical activity with one to two period.


Physical Therapy Plan: PT once daily for gait training, transfer training, LE 

strengthening and balance exercises. Ongoing PT is recommended upon discharge 

from hospital and wheeled walker.

## 2017-05-02 NOTE — OCCUPATIONAL THERAPY TX NOTE
Occupational Therapy Tx Note





- Treatment Note


Tolerated: Good


Total Time Spent With Patient: 45 (ADL)


Occupational Therapy Treatment Note: Detail (S:  Pt alert and wanting to 

shower.  O:  Supine to sit Indly.  Amb to shower chair with 2 wheeled walker 

and SBA.  Doffed gown, briefs and slippers Indly.  Pt completed showering in 

sitting and standing with set up and using grab bars for balance, roxann with eyes 

closed.  Pt dried self in standing Indly.  Donned gown, slippers and briefs in 

sitting Indly.  Pt amb to sink and completed hair care Indly in standing.  Amb 

to chair with walker and SBA.  Ind with stand to sit.  A:  Ind with showering 

using seat and grab bar for balance.  Ind with partial dressing.  Pt able to 

identify balance deficits and modify technique appropriately.)


Occupational Therapy Problem List: Detail (1.  Decreased Ind with self care 

activities.  2.  Decreased endurance needed for safe and Ind showering/dressing.

)


Occupational Therapy Goals: 1.  Pt will be Ind with showering.  2.  Pt will be 

Ind with total body dressing.  3.  Pt will demonstrate improved endurance 

needed for safe and Ind self cares.


Prognosis: Good


Occupational Therapy Plan: OT 1-4 times weekly to address self cares, 

functional mobility, endurance and safety to allow safe and Ind return home.

## 2017-05-02 NOTE — PHYSICIAN PROGRESS NOTE
Subjective





- Date


Date of Physician Progress Note: 05/02/17





- Subjective


Subjective Comment: 


Patient states she is feeling better today. She has been up with PT/OT today 

and has had significant improvement in her strength. Was able to shower with 

OT. Has needed minimal assistance from nursing to ambulate to the bathroom. Has 

been tolerating clear liquids and appetite has improved somewhat but not back 

to normal. Patient's son states she seems closer to her baseline today. she is 

oriented x3 today. she denies chest pain, abdominal pain, nausea, headache, or 

diarrhea. 





Objective





- Vital Signs


Vital Signs: 





 Vital Signs - Last 24 Hrs











  Temp Pulse Resp BP Pulse Ox


 


 05/01/17 21:00   83  16  


 


 05/01/17 17:00  97.6 F  91 H  16  92/57  98














- General


General Appearance: Alert, Oriented x3, Cooperative, No acute distress


Limitations: No limitations, Other (the patient is the primary historian)





- Head


Head exam: Atraumatic, Normal inspection


Head exam detail: negative: Abrasion, Contusion





- Eye


Eye exam: Normal appearance, PERRL, EOMI.  negative: Conjunctival injection, 

Periorbital swelling





- ENT


ENT exam: Normal exam, Mucous membranes moist, Normal external ear exam, Normal 

orophraynx


Ear exam: Normal external inspection.  negative: External canal tenderness


Nasal Exam: Normal inspection.  negative: Discharge, Sinus tenderness


Mouth exam: Normal external inspection, Tongue normal


Teeth exam: Normal inspection.  negative: Dental caries


Throat exam: Normal inspection.  negative: Tonsillar erythema, Tonsillar exudate





- Neck


Neck exam: Normal inspection, Full ROM.  negative: Lymphadenopathy, Tenderness, 

Thyromegaly





- Respiratory


Respiratory exam: Normal lung sounds bilaterally.  negative: Respiratory 

distress





- Cardiovascular


Cardiovascular Exam: Regular rate, Normal rhythm, Normal heart sounds


Peripheral Pulses: 2+: Radial (R), Radial (L), Dorsalis Pedis (R), Dorsalis 

Pedis (L)





- GI/Abdominal


GI/Abdominal exam: Soft, Normal bowel sounds.  negative: Tenderness





- Rectal


Rectal exam: Deferred





- 


 exam: Deferred





- Extremities


Extremities exam: Normal inspection, Full ROM, Normal capillary refill.  

negative: Tenderness





- Back


Back exam: Reports: Normal inspection (several areas of small sebacious gland 

plugging), Full ROM.  Denies: Muscle spasm, Rash noted, Tenderness





- Neurological


Neurological exam: Alert, CN II-XII intact.  negative: Altered, Motor sensory 

deficit





- Psychiatric


Psychiatric exam: Normal affect, Normal mood.  negative: Agitated, Anxious, 

Depressed





- Skin


Skin exam: Dry, Intact, Normal color, Rash (poor hygeine related dermatitis 

christiano area), Warm


Type of lesion: Other (approx .5 x .5cm area of circular erythema where tick 

was removed behind left ear)





Assessment and Plan





- Assessment and Plan


(1) Generalized weakness


Current Visit: Yes   Status: Acute   Base Code: R53.1 - WEAKNESS


Comment: 5/2/17- Improved. UTI is most likely cause of acute weakness with 

significant improvement with antibiotics.  Lyme's antibody titers still 

pending. urine culture still pending.


- continue IV rescuscitation


- continue Rocephin 2gm Q 24 hours


-continue PT/OT daily. If continues to progress will be candidate for home 

therapy in place of [facility]


-vitals q8H


-repeat labs qam 








(2) UTI (urinary tract infection)


Current Visit: Yes   Status: Acute   Qualifiers: 


     Urinary tract infection type: acute cystitis     Hematuria presence: 

without hematuria     Qualified Code(s): N30.00 - Acute cystitis without 

hematuria     Base Code: N39.0 - URINARY TRACT INFECTION, SITE NOT SPECIFIED


Comment: 5/2/17- UA positive for protein, nitrites, blood and leukocytes. 

Improving clinically with rocephin. culture pending


-continue rocephin 2gm IV q24H








(3) Dehydration


Current Visit: Yes   Status: Acute   Base Code: E86.0 - DEHYDRATION


Comment: 5/2/17- resolving. patient tolerating po fluids.  BUN of 23 and Cr of 

1.0 today.


-continue to encourage po intake


-vitals q8H


-repeat labs qam 








(4) Tick bite of ear


Current Visit: Yes   Status: Acute      Base Code: S00.469A - INSECT BITE (

NONVENOMOUS) OF UNSPECIFIED EAR, INIT ENCNTR; W57.XXXA - BIT/STUNG BY NONVENOM 

INSECT & OTH NONVENOM ARTHROPODS, INIT


Comment: 5/2/17- Tick Successfully removed with head and body intact. specimen 

sent to lab for analysis. Unsure of tick imbedded but does have indoor/outdoor 

cats at home. CRP down from 17 to 9 today. 


-lyme IgG and IgM pending


- Will continue Rocephin 2gm Q 24 hours for Lyme prophylaxis as well as UTI 

treatment


- continue to monitor area behind left ear








(5) Full code status


Current Visit: Yes   Status: Acute   Base Code: Z78.9 - OTHER SPECIFIED HEALTH 

STATUS


Comment: 5/2/17- will remain full code during this hospitalization








(6) DVT prophylaxis


Current Visit: Yes   Status: Acute   Base Code: [id number] - 


Comment: 5/2/17-patient high risk with age and restricted mobility


-Lovenox 40mg QD for prophylaxis











Results





- Labs


Result Diagrams: 


 04/30/17 17:30





 05/01/17 06:45





DVT/PE Assessment





- Risk for VTE


Risk for VTE: No


Risk Level: Moderate


Risk Assessment Date: 05/01/17


Risk Assessment Time: 07:32


VTE Orders Placed or Will Be Placed: Yes





- Active Medicaitons


Current Medications: 





 Current Medications





Acetaminophen (Tylenol 500mg Tab)  1,000 mg PO Q6H PRN


   PRN Reason: PAIN/TEMP


Atenolol (Tenormin)  25 mg PO DAILY [facility]


   Last Admin: 05/01/17 11:02 Dose:  Not Given


Atenolol (Tenormin)  50 mg PO DAILY [facility]


   Last Admin: 05/01/17 11:02 Dose:  Not Given


Digoxin (Lanoxin)  125 mcg PO DAILY [facility]


   Last Admin: 05/01/17 11:01 Dose:  125 mcg


Enoxaparin Sodium (Lovenox)  40 mg SQ DAILY [facility]


   Last Admin: 05/01/17 11:02 Dose:  40 mg


Furosemide (Lasix)  20 mg PO DAILY [facility]


   Last Admin: 05/01/17 11:01 Dose:  Not Given


Sodium Chloride ()  1,000 mls @ 15 mls/hr IV .Q24H PRN


   PRN Reason: LARGE VOLUME IV


Ceftriaxone Sodium 2 gm/ (Sodium Chloride)  100 mls @ 200 mls/hr IVPB Q24H [facility]


   Stop: 05/06/17 10:01


   Last Admin: 05/01/17 11:02 Dose:  200 mls/hr


Potassium Chloride (Klor-Con)  10 meq PO DAILY [facility]


   Last Admin: 05/01/17 11:01 Dose:  10 meq


Simvastatin (Zocor)  5 mg PO QHS [facility]


   Last Admin: 05/01/17 21:57 Dose:  5 mg


Vitamin D (Vitamin D3)  2,000 unit PO DAILY [facility]


Zinc Oxide (Desitin)  28.35 gm TOP BID PRN


   PRN Reason: RASH


   Last Admin: 05/01/17 22:00 Dose:  28.35 gm











AMI Plan





- Labs


Result Diagrams: 


 04/30/17 17:30





 05/01/17 06:45

## 2017-05-02 NOTE — RADIOLOGY REPORT
EXAM:  CHEST, TWO VIEWS



HISTORY:  WEAKNESS.  



TECHNIQUE:  Frontal and lateral views of the chest were obtained.  



Comparison:  None.  



FINDINGS:  Cardiomegaly with atheromatous change of the thoracic aorta.  
Osteopenia.  The lungs are clear.  No pneumothorax.  



IMPRESSION:  

CARDIOMEGALY.  NO ACUTE CARDIOPULMONARY PROCESS.  



JOB NUMBER:  [id number]
[facility]DD

## 2017-05-03 LAB
ALBUMIN SERPL-MCNC: 3.2 GM/DL (ref 3.5–5)
ALBUMIN/GLOB SERPL: 0.9 {RATIO} (ref 1.1–1.8)
ALP SERPL-CCNC: 123 U/L (ref 38–126)
ALT SERPL-CCNC: 46 U/L (ref 9–52)
ANION GAP SERPL CALC-SCNC: 8.6 MMOL/L (ref 7–16)
AST SERPL-CCNC: 55 U/L (ref 14–36)
BASOPHILS NFR BLD: 0.7 % (ref 0–6)
BILIRUB SERPL-MCNC: 0.52 MG/DL (ref 0.2–1.3)
BUN SERPL-MCNC: 17 MG/DL (ref 7–17)
CO2 SERPL-SCNC: 24.4 MMOL/L (ref 22–30)
CREAT SERPL-MCNC: 1 MG/DL (ref 0.52–1.04)
CRP SERPL-MCNC: 6.2 MG/DL (ref 0–0.9)
EOSINOPHIL NFR BLD: 2 % (ref 0–6)
ERYTHROCYTE [DISTWIDTH] IN BLOOD BY AUTOMATED COUNT: 13.9 % (ref 11.5–14.5)
EST GLOMERULAR FILTRATION RATE: 57 ML/MIN
GLOBULIN SER-MCNC: 3.4 GM/DL (ref 1.4–4.8)
GLUCOSE SERPL-MCNC: 98 MG/DL (ref 70–110)
GRANULOCYTES NFR BLD: 65.8 % (ref 47–80)
HCT VFR BLD CALC: 36.2 % (ref 35–47)
HGB BLD-MCNC: 11.7 GM/DL (ref 11.6–16)
LYMPHOCYTES NFR BLD AUTO: 19.4 % (ref 16–45)
MCH RBC QN AUTO: 30.8 PG (ref 27–33)
MCHC RBC AUTO-ENTMCNC: 32.3 G/DL (ref 32–36)
MCV RBC AUTO: 95.3 FL (ref 81–97)
MONOCYTES NFR BLD: 12.1 % (ref 0–9)
PLATELET # BLD: 400 K/UL (ref 130–400)
PMV BLD AUTO: 9 FL (ref 7.4–10.4)
PROT SERPL-MCNC: 6.6 GM/DL (ref 6.3–8.2)
RBC # BLD AUTO: 3.8 M/UL (ref 3.8–5.4)
WBC # BLD AUTO: 5.9 K/UL (ref 4.2–12.2)

## 2017-05-03 RX ADMIN — ATENOLOL SCH: 25 TABLET ORAL at 09:39

## 2017-05-03 RX ADMIN — VITAMIN D, TAB 1000IU (100/BT) SCH UNIT: 25 TAB at 09:38

## 2017-05-03 RX ADMIN — CEFTRIAXONE SCH MLS/HR: 2 INJECTION, POWDER, FOR SOLUTION INTRAMUSCULAR; INTRAVENOUS at 09:39

## 2017-05-03 RX ADMIN — FUROSEMIDE SCH MG: 20 TABLET ORAL at 09:38

## 2017-05-03 RX ADMIN — ATENOLOL SCH: 50 TABLET ORAL at 09:40

## 2017-05-03 RX ADMIN — DIGOXIN SCH MCG: 125 TABLET ORAL at 09:39

## 2017-05-03 RX ADMIN — ENOXAPARIN SODIUM SCH MG: 40 INJECTION SUBCUTANEOUS at 09:39

## 2017-05-03 RX ADMIN — ATENOLOL SCH MG: 25 TABLET ORAL at 13:15

## 2017-05-03 RX ADMIN — POTASSIUM CHLORIDE SCH MEQ: 750 TABLET, FILM COATED, EXTENDED RELEASE ORAL at 09:38

## 2017-05-03 NOTE — DISCHARGE SUMMARY
Providers


Discharge Summary Date: 05/03/17


Date of admission: 


05/01/17 11:36





Expected Date of Discharge: 05/03/17


Attending physician: 


LILLIE EMERY





Primary care physician: 


Pam Morel








Physical Exam





- Vital Signs


Vital Signs: 


 Vital Signs - Last 24 Hrs











  Temp Pulse Resp BP Pulse Ox


 


 05/03/17 09:00  98.4 F  78  16  102/53  98


 


 05/03/17 00:00  97.9 F  79  16  106/59  98


 


 05/02/17 21:00   80   


 


 05/02/17 17:00  97.6 F  80  18  100/56  99














- General


General Appearance: Alert, Oriented x3, Cooperative, No acute distress


Limitations: No limitations, Other (the patient is the primary historian)





- Head


Head exam: Atraumatic, Normal inspection


Head exam detail: negative: Abrasion, Contusion





- Eye


Eye exam: Normal appearance, PERRL, EOMI.  negative: Conjunctival injection, 

Periorbital swelling





- ENT


ENT exam: Normal exam, Mucous membranes moist, Normal external ear exam, Normal 

orophraynx


Ear exam: Normal external inspection.  negative: External canal tenderness


Nasal Exam: Normal inspection.  negative: Discharge, Sinus tenderness


Mouth exam: Normal external inspection, Tongue normal


Teeth exam: Normal inspection.  negative: Dental caries


Throat exam: Normal inspection.  negative: Tonsillar erythema, Tonsillar exudate





- Neck


Neck exam: Normal inspection, Full ROM.  negative: Lymphadenopathy, Tenderness, 

Thyromegaly





- Respiratory


Respiratory exam: Normal lung sounds bilaterally.  negative: Respiratory 

distress





- Cardiovascular


Cardiovascular Exam: Regular rate, Normal rhythm, Normal heart sounds


Peripheral Pulses: 2+: Radial (R), Radial (L), Dorsalis Pedis (R), Dorsalis 

Pedis (L)





- GI/Abdominal


GI/Abdominal exam: Soft, Normal bowel sounds.  negative: Tenderness





- Rectal


Rectal exam: Deferred





- 


 exam: Deferred





- Extremities


Extremities exam: Normal inspection, Full ROM, Normal capillary refill.  

negative: Tenderness





- Back


Back exam: Reports: Normal inspection (several areas of small sebacious gland 

plugging), Full ROM.  Denies: Muscle spasm, Rash noted, Tenderness





- Neurological


Neurological exam: Alert, CN II-XII intact.  negative: Altered, Motor sensory 

deficit





- Psychiatric


Psychiatric exam: Normal affect, Normal mood.  negative: Agitated, Anxious, 

Depressed





- Skin


Skin exam: Dry, Intact, Normal color, Rash (poor hygeine related dermatitis 

christiano area), Warm


Type of lesion: Other (approx .5 x .5cm area of circular erythema where tick 

was removed behind left ear)





Hospitalization





- Hospitalization


Admission Diagnosis: Generalized Weakness.  UTI.  Dehydration





- Problem List/Discharge Diagnosis


(1) Generalized weakness


Current Visit: Yes   Status: Acute   Base Code: R53.1 - WEAKNESS


Comment: 5/3/17- resolved.  UTI is most likely cause of acute weakness with 

significant improvement with antibiotics.  Lyme's antibody titers still 

pending. urine culture still pending. 


-transition to oral antibiotics


-home pt/ot and nursing set up by social work 


-patient has follow up appointment scheduled tomorrow at 2pm wt Dr. Morel








(2) UTI (urinary tract infection)


Current Visit: Yes   Status: Acute   Discharge Diagnosis: 


     Urinary tract infection type: acute cystitis     Hematuria presence: 

without hematuria     Qualified Code(s): N30.00 - Acute cystitis without 

hematuria     Base Code: N39.0 - URINARY TRACT INFECTION, SITE NOT SPECIFIED


Comment: 5/3/17- UA positive for protein, nitrites, blood and leukocytes. 

continues to improve clinically with rocephin. culture pending. CRP down from 

17 to 6.2


-transition to cefdinir 300mg po q12h for 8 more days


-will have lab forward culture results to pcp 


-has follow up with Dr. Morel tomorrow








(3) Dehydration


Current Visit: Yes   Status: Acute   Base Code: E86.0 - DEHYDRATION


Comment: 5/3/17- resolved. patient continues to tolerate po fluids.  BUN of 17 

and Cr of 1.0 today.











(4) Tick bite of ear


Current Visit: Yes   Status: Acute      Base Code: S00.469A - INSECT BITE (

NONVENOMOUS) OF UNSPECIFIED EAR, INIT ENCNTR; W57.XXXA - BIT/STUNG BY NONVENOM 

INSECT & OTH NONVENOM ARTHROPODS, INIT


Comment: 5/3/17- Tick Successfully removed with head and body intact. specimen 

sent to lab for analysis and is still pending. Unsure if tick imbedded but does 

have indoor/outdoor cats at home. CRP down from 17 to 9 today. 


-lyme IgG and IgM pending


-will transition to doxycycline 100mg po q12H for 8 more days for total of 10 

day course for possible lyme disease. will have lab forward results of testing 

to Pcp and may discontinue the doxycycline if no lyme's present. 








(5) Full code status


Current Visit: Yes   Status: Acute   Base Code: Z78.9 - OTHER SPECIFIED HEALTH 

STATUS


Comment: 5/3/17- will remain full code during this hospitalization








(6) DVT prophylaxis


Current Visit: Yes   Status: Acute   Base Code: [id number] - 


Comment: 5/3/17-patient was high risk with age and restricted mobility


-Lovenox 40mg QD for prophylaxis











- Hospitalization Course


Disposition: Home Health Service


Hospital Course: 


82 y/o female with CC weakness x 2 weeks admitted for UTI, mild dehydration and 

weakness.





PMX:colon ca


PSX: none reported








Patient is a poor historian. History given with the assistance of Margi tomas. Prior to arrival patient had been experiencing generalized weakness 

for about 2 weeks since death of sister. Schultz been very fatigued, lethargic, poor 

appetite. Did have episode of dizziness with standing today without syncope. 

Denies vomiting or diarrhea, abdominal pain, chest pain, MORRIS or shortness of 

breath, fever. Denies new medications, changes in speech, focalized weakness or 

numbness. Was recently diagnosed with vitamin D deficiency- unsure of what the 

level was.  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 

there is a component of depression since the death of the patient's sister and 

concerned about how safe the patient and her  are now in the home.











While in ED VSS. CBC was normal, Na 135, BUN 31, Cr 1.3. Responded well to IVF 

x 2 liters, was up and walked to the bathroom from one side of ED to the other 

without complaint of dizziness or syncopal episode. Urine + for protein, 

nitrites, blood and sent for routine culture. Was given Levaquin IVPB in ED. 

Head CT atrophy, small vessel ischemic changes, no acute process. Admitted to 

the floor for IV hydration, IV Levaquin and PT/OT eval for weakness. 











5/1/17- resting comfortably in bed, denies pain, abdominal pain, dizziness. 

Does report feels weak and wants to participate in therapy. Nursing noted a 

tick behind left ear this am. Patient reports she has not been doing any 

gardening waling in the woods but does have 2 outdoor/indoor cats. Was unaware 

she had a tick behind her ear, denies muscle aches or flu-like symptoms outside 

of current weakness, fatigue, poor appetite. Nursing staff successfully removed 

tick with head and body intact, sent to lab for further processing. She lives 

with her  who is elderly but does not require physical assist for ADL or 

care. 





5/2/17- Patient states she is feeling better today. She has been up with PT/OT 

today and has had significant improvement in her strength. Was able to shower 

with OT. Has needed minimal assistance from nursing to ambulate to the 

bathroom. Has been tolerating clear liquids and appetite has improved somewhat 

but not back to normal. Patient's son states she seems closer to her baseline 

today. she is oriented x3 today. she denies chest pain, abdominal pain, nausea, 

headache, or diarrhea. 





5/3/17- Patient continues to improve. she reports strength and function back to 

baseline. PT/OT feel she is ambulating well and able to perform ADL's but do 

recommend home pt/ot to ensure she continues to be independent once home. 

patient agrees with that plan. Her appetite has improved as well. having normal 

BM and urine output. No confusion, headache, vision changes, focal weakness, 

nausea, abdominal pain or diarrhea. 


Abnormal Labs: 


 Abnormal Lab Results











  05/02/17 05/03/17 05/03/17 Range/Units





  06:53 06:50 06:50 


 


Monocytes %    12.1 H  (0-9)  %


 


Calcium     (8.5-10.1)  mg/dL


 


AST     (14-36)  U/L


 


C-Reactive Protein  9.0 H    (0.0-0.9)  mg/dL


 


Albumin     (3.5-5.0)  gm/dL


 


Albumin/Globulin Ratio     (1.1-1.8)  


 


Digoxin   < 0.40 L   (0.9-2.0)  ng/mL














  05/03/17 Range/Units





  06:50 


 


Monocytes %   (0-9)  %


 


Calcium  8.4 L  (8.5-10.1)  mg/dL


 


AST  55 H  (14-36)  U/L


 


C-Reactive Protein  6.2 H  (0.0-0.9)  mg/dL


 


Albumin  3.2 L  (3.5-5.0)  gm/dL


 


Albumin/Globulin Ratio  0.9 L  (1.1-1.8)  


 


Digoxin   (0.9-2.0)  ng/mL











Condition at Discharge: (2) Stable





Discharge Medications





- Discharge Medications


Prescriptions: 


Cefdinir 300 mg PO Q12H #16 capsule


Doxycycline Hyclate [Doxycycline] 100 mg PO Q12H #16 cap


Home Medications: 


 Ambulatory Orders





Atenolol [Tenormin] 25 mg PO DAILY 04/30/17 [Last Taken 04/29/17]


Atenolol [Tenormin] 50 mg PO DAILY 04/30/17 [Last Taken 04/29/17]


Digoxin [Lanoxin] 125 mcg PO DAILY 04/30/17 [Last Taken 04/29/17]


Ergocalciferol (Vitamin D2) [Vitamin D2] 50,000 unit PO WEEKLY 04/30/17 [Last 

Taken Unknown]


Furosemide [Lasix] 20 mg PO DAILY 04/30/17 [Last Taken 04/29/17]


Potassium Chloride [K-Tab ER] 10 meq PO DAILY 04/30/17 [Last Taken 04/29/17]


Pravastatin Sodium [Pravachol] 10 mg PO DAILY 04/30/17 [Last Taken 04/29/17]


Cefdinir 300 mg PO Q12H #16 capsule 05/03/17 [Last Taken Unknown]


Doxycycline Hyclate [Doxycycline] 100 mg PO Q12H #16 cap 05/03/17 [Last Taken 

Unknown]











Discharge Plan





- Discharge Instructions


Activity at Discharge: As Per Physical Therapy


Diet at Discharge: Regular Diet


Instructions:  Dehydration (ED)


Additional Instructions: 


[facility] for RN, PT, OT, A [phone].  [facility] will 

call to set start of care visit, if you do not hear from them by 5/4, please 

call.


You must stay well hydrated over the next few days


Return if worse, fever, vomiting, not eating or drinking


You will need to schedule with Dr. Morel within one week





Continue the cefdinir 300mg twice daily for 8 more days 


Continue doxycycline 100mg twice daily for 8 more days or until we have 

received the lyme's test results which we will forward to Dr. Morel

## 2017-08-06 ENCOUNTER — HOSPITAL ENCOUNTER (INPATIENT)
Dept: HOSPITAL 59 - ER | Age: 82
LOS: 3 days | Discharge: HOME | DRG: 195 | End: 2017-08-09
Attending: FAMILY MEDICINE | Admitting: FAMILY MEDICINE
Payer: MEDICARE

## 2017-08-06 DIAGNOSIS — I25.10: ICD-10-CM

## 2017-08-06 DIAGNOSIS — Z91.89: ICD-10-CM

## 2017-08-06 DIAGNOSIS — I10: ICD-10-CM

## 2017-08-06 DIAGNOSIS — E78.00: ICD-10-CM

## 2017-08-06 DIAGNOSIS — J18.9: Primary | ICD-10-CM

## 2017-08-06 LAB
ALBUMIN SERPL-MCNC: 3.9 GM/DL (ref 3.5–5)
ALBUMIN/GLOB SERPL: 1.3 {RATIO} (ref 1.1–1.8)
ALP SERPL-CCNC: 81 U/L (ref 38–126)
ALT SERPL-CCNC: 37 U/L (ref 9–52)
ANION GAP SERPL CALC-SCNC: 10.7 MMOL/L (ref 7–16)
APPEARANCE UR: (no result)
AST SERPL-CCNC: 30 U/L (ref 14–36)
BACTERIA #/AREA URNS HPF: (no result) /[HPF]
BASOPHILS NFR BLD: 0.1 % (ref 0–6)
BILIRUB SERPL-MCNC: 1.11 MG/DL (ref 0.2–1.3)
BILIRUB UR-MCNC: NEGATIVE MG/DL
BUN SERPL-MCNC: 21 MG/DL (ref 7–17)
CO2 SERPL-SCNC: 26.3 MMOL/L (ref 22–30)
COLOR UR: YELLOW
CREAT SERPL-MCNC: 1.1 MG/DL (ref 0.52–1.04)
EOSINOPHIL NFR BLD: 0 % (ref 0–6)
ERYTHROCYTE [DISTWIDTH] IN BLOOD BY AUTOMATED COUNT: 14.2 % (ref 11.5–14.5)
EST GLOMERULAR FILTRATION RATE: 51 ML/MIN
GLOBULIN SER-MCNC: 3.1 GM/DL (ref 1.4–4.8)
GLUCOSE SERPL-MCNC: 126 MG/DL (ref 70–110)
GLUCOSE UR STRIP-MCNC: NEGATIVE MG/DL
HCT VFR BLD CALC: 37.4 % (ref 35–47)
HGB BLD-MCNC: 12.3 GM/DL (ref 11.6–16)
KETONES UR QL STRIP: NEGATIVE
LIPASE SERPL-CCNC: 38 U/L (ref 23–300)
LYMPHOCYTES NFR BLD AUTO: 4 % (ref 16–45)
MCH RBC QN AUTO: 32 PG (ref 27–33)
MCHC RBC AUTO-ENTMCNC: 32.9 G/DL (ref 32–36)
MCV RBC AUTO: 97.4 FL (ref 81–97)
MONOCYTES NFR BLD: 5.4 % (ref 0–9)
NITRITE UR QL STRIP: POSITIVE
PLATELET # BLD: 260 K/UL (ref 130–400)
PMV BLD AUTO: 10 FL (ref 7.4–10.4)
PROT SERPL-MCNC: 7 GM/DL (ref 6.3–8.2)
RBC # BLD AUTO: 3.84 M/UL (ref 3.8–5.4)
RBC # UR STRIP: (no result) /UL
RBC #/AREA URNS HPF: (no result) /[HPF]
URINE LEUKOCYTE ESTERASE: (no result)
UROBILINOGEN UR STRIP-ACNC: 0.2 E.U./DL (ref 0.2–1)
WBC # BLD AUTO: 16.2 K/UL (ref 4.2–12.2)
WBC #/AREA URNS HPF: (no result) /[HPF]

## 2017-08-06 PROCEDURE — 96361 HYDRATE IV INFUSION ADD-ON: CPT

## 2017-08-06 PROCEDURE — 96365 THER/PROPH/DIAG IV INF INIT: CPT

## 2017-08-06 PROCEDURE — 99223 1ST HOSP IP/OBS HIGH 75: CPT

## 2017-08-06 PROCEDURE — 96375 TX/PRO/DX INJ NEW DRUG ADDON: CPT

## 2017-08-06 PROCEDURE — 99285 EMERGENCY DEPT VISIT HI MDM: CPT

## 2017-08-06 PROCEDURE — 80162 ASSAY OF DIGOXIN TOTAL: CPT

## 2017-08-06 PROCEDURE — 81001 URINALYSIS AUTO W/SCOPE: CPT

## 2017-08-06 PROCEDURE — 83690 ASSAY OF LIPASE: CPT

## 2017-08-06 PROCEDURE — 93041 RHYTHM ECG TRACING: CPT

## 2017-08-06 PROCEDURE — 85025 COMPLETE CBC W/AUTO DIFF WBC: CPT

## 2017-08-06 PROCEDURE — 80053 COMPREHEN METABOLIC PANEL: CPT

## 2017-08-06 PROCEDURE — 71020: CPT

## 2017-08-06 RX ADMIN — SIMVASTATIN SCH: 10 TABLET, FILM COATED ORAL at 23:55

## 2017-08-06 NOTE — EMERGENCY DEPARTMENT RECORD
History of Present Illness





- General


Chief complaint: Nausea, Vomiting, Diarrhea


Stated complaint: VOMITTING


Time Seen by Provider: 17 17:21


Source: Patient


Mode of Arrival: Wheelchair


Limitations: No limitations





- History of Present Illness


Initial comments: 





pt has been vomiting and feels very weak.


MD complaint: Nausea, Vomiting


Onset/Timin


-: Days(s)


Description of Vomiting: Bilious


Associated Abdominal Pain: No


Severity: Mild


Consistency: Constant


Improves with: None


Worsens with: None


Associated Symptoms: Fever/chills, Loss of appetite, Malaise, Nausea/vomiting





- Related Data


 Home Medications











 Medication  Instructions  Recorded  Confirmed  Last Taken


 


Atenolol [Tenormin] 50 mg PO DAILY 17


 


Digoxin [Lanoxin] 125 mcg PO DAILY 17


 


Ergocalciferol (Vitamin D2) 50,000 unit PO WEEKLY 17 Unknown





[Vitamin D2]    


 


Furosemide [Lasix] 20 mg PO DAILY 17


 


Potassium Chloride [K-Tab ER] 10 meq PO DAILY 17


 


Pravastatin Sodium [Pravachol] 10 mg PO DAILY 17








 Previous Rx's











 Medication  Instructions  Recorded


 


Doxycycline Hyclate [Doxycycline] 100 mg PO Q12H #16 cap 17











 Allergies











Allergy/AdvReac Type Severity Reaction Status Date / Time


 


cephalexin monohydrate Allergy  HIVES Verified 17 17:16





[From Keflex]     














Travel Screening





- Travel/Exposure Within Last 30 Days


Have you traveled within the last 30 days?: No





- Travel/Exposure Within Last Year


Have you traveled outside the [place] in the last year?: No





- Additonal Travel Details


Have you been exposed to anyone with a communicable illness?: No





Review of Systems


Reviewed: No additional complaints except as noted below


Constitutional: Reports: As per HPI.  Denies: Chills, Fever, Malaise, Night 

sweats, Weakness, Weight change


Eyes: Reports: As per HPI.  Denies: Eye discharge, Eye pain, Photophobia, 

Vision change


ENT: Reports: As per HPI.  Denies: Congestion, Dental pain, Ear pain, Epistaxis

, Hearing loss, Throat pain


Respiratory: Reports: As per HPI.  Denies: Cough, Dyspnea, Hemoptysis, Stridor, 

Wheezes


Cardiovascular: Reports: As per HPI.  Denies: Arrhythmia, Chest pain, Dyspnea 

on exertion, Edema, Murmurs, Orthopnea, Palpitations, Paroxysmal nocturnal 

dyspnea, Rheumatic Fever, Syncope


Endocrine: Reports: As per HPI.  Denies: Fatigue, Heat or cold intolerance, 

Polydipsia, Polyuria


Gastrointestinal: Reports: As per HPI.  Denies: Abdominal pain, Constipation, 

Diarrhea, Hematemesis, Hematochezia, Melena, Nausea, Vomiting


Genitourinary: Reports: As per HPI.  Denies: Abnormal menses, Discharge, 

Dyspareunia, Dysuria, Frequency, Hematuria, Incontinence, Retention, Urgency


Musculoskeletal: Reports: As per HPI.  Denies: Arthralgia, Back pain, Gout, 

Joint swelling, Myalgia, Neck pain


Skin: Reports: As per HPI.  Denies: Bruising, Change in color, Change in hair/

nails, Lesions, Pruritus, Rash


Neurological: Reports: As per HPI.  Denies: Abnormal gait, Confusion, Headache, 

Numbness, Paresthesias, Seizure, Tingling, Tremors, Vertigo, Weakness


Psychiatric: Reports: As per HPI.  Denies: Anxiety, Auditory hallucinations, 

Depression, Homicidal thoughts, Suicidal thoughts, Visual hallucinations


Hematological/Lymphatic: Reports: As per HPI.  Denies: Anemia, Blood Clots, 

Easy bleeding, Easy bruising, Swollen glands





Past Medical History





- SOCIAL HISTORY


Smoking Status: Never smoker


Alcohol Use: None


Drug Use: None





- RESPIRATORY


Hx Respiratory Disorders: No


Comment:: PATIENT IS POOR HISTORIAN





- CARDIOVASCULAR


Hx Cardio Disorders: No


Comment:: PATIENT IS POOR HISTORIAN





- NEURO


Hx Neuro Disorders: No


Comment:: PATIENT IS POOR HISTORIAN





- GI


Hx GI Disorders: No


Comment:: PATIENT IS POOR HISTORIAN





- 


Hx Genitourinary Disorders: No


Comment:: PATIENT IS POOR HISTORIAN





- ENDOCRINE


Hx Endocrine Disorders: No


Comment:: PATIENT IS POOR HISTORIAN





- MUSCULOSKELETAL


Hx Musculoskeletal Disorders: No


Comment:: PATIENT IS POOR HISTORIAN





- PSYCH


Hx Psych Problems: No


Comment:: PATIENT IS POOR HISTORIAN





- HEMATOLOGY/ONCOLOGY


Hx Hematology/Oncology Disorders: Yes


Hx Cancer: Yes (colon)





Family Medical History


Any Significant Family History?: No





Physical Exam





- General


General Appearance: Alert, Oriented x3, Cooperative, Mild distress





- Head


Head exam: Normal inspection





- Eye


Eye exam: Normal appearance, PERRL, EOMI


Pupils: Normal accommodation





- ENT


ENT exam: Normal exam, Mucous membranes dry, Normal external ear exam, Normal 

orophraynx


Ear exam: Normal external inspection.  negative: External canal tenderness


Nasal Exam: Normal inspection.  negative: Discharge, Sinus tenderness


Mouth exam: Normal external inspection, Tongue normal


Teeth exam: Normal inspection.  negative: Dental caries


Throat exam: Normal inspection.  negative: Tonsillar erythema, Tonsillar exudate





- Neck


Neck exam: Normal inspection, Full ROM.  negative: Tenderness





- Respiratory


Respiratory exam: Normal lung sounds bilaterally.  negative: Respiratory 

distress





- Cardiovascular


Cardiovascular Exam: Regular rate, Normal rhythm, Normal heart sounds





- GI/Abdominal


GI/Abdominal exam: Soft, Normal bowel sounds.  negative: Tenderness





- Rectal


Rectal exam: Deferred





- 


 exam: Deferred





- Extremities


Extremities exam: Normal inspection, Full ROM, Normal capillary refill.  

negative: Tenderness





- Back


Back exam: Reports: Normal inspection, Full ROM.  Denies: Muscle spasm, Rash 

noted, Tenderness





- Neurological


Neurological exam: Alert, CN II-XII intact, Normal gait, Oriented X3





- Psychiatric


Psychiatric exam: Normal affect, Normal mood





- Skin


Skin exam: Dry, Intact, Normal color, Warm





Course





 Vital Signs











  17





  16:59


 


Temperature 99.8 F H


 


Pulse Rate 84


 


Respiratory 20





Rate 


 


Blood Pressure 110/58


 


Pulse Ox 92 L














Medical Decision Making





- Lab Data


Result diagrams: 


 17 17:45





 17 17:45





Disposition


Disposition: Admit


Clinical Impression: 


 Pyelonephritis





Disposition: Still a Patient at [facility]


Decision to Admit: Admit from ER


Decision to Admit Date: 17


Decision to Admit Time: 19:07





Quality





- Quality Measures


Quality Measures: N/A





- Blood Pressure Screening


Blood Pressure Classification: Normal BP Reading


Systolic Measurement: 110


Diastolic Measurement: 58


Screening for High Blood Pressure: < Normal BP, F/U Not Required > []


Normal BP Follow-up Interventions: No follow-up required

## 2017-08-06 NOTE — EMERGENCY DEPARTMENT RECORD
History of Present Illness





- General


Chief complaint: Nausea, Vomiting, Diarrhea


Stated complaint: VOMITTING


Time Seen by Provider: 17 17:21


Source: Patient


Mode of Arrival: Wheelchair


Limitations: No limitations





- History of Present Illness


MD complaint: Nausea, Vomiting


Onset/Timin


-: Days(s)


Description of Vomiting: Bilious


Associated Abdominal Pain: No


Severity: Mild


Consistency: Constant


Improves with: None


Worsens with: None


Associated Symptoms: Fever/chills, Loss of appetite, Malaise, Nausea/vomiting





- Related Data


 Home Medications











 Medication  Instructions  Recorded  Confirmed  Last Taken


 


Atenolol [Tenormin] 50 mg PO DAILY 17


 


Digoxin [Lanoxin] 125 mcg PO DAILY 17


 


Ergocalciferol (Vitamin D2) 50,000 unit PO WEEKLY 17 Unknown





[Vitamin D2]    


 


Furosemide [Lasix] 20 mg PO DAILY 17


 


Potassium Chloride [K-Tab ER] 10 meq PO DAILY 17


 


Pravastatin Sodium [Pravachol] 10 mg PO DAILY 17








 Previous Rx's











 Medication  Instructions  Recorded


 


Doxycycline Hyclate [Doxycycline] 100 mg PO Q12H #16 cap 17











 Allergies











Allergy/AdvReac Type Severity Reaction Status Date / Time


 


cephalexin monohydrate Allergy  HIVES Verified 17 17:16





[From Keflex]     














Travel Screening





- Travel/Exposure Within Last 30 Days


Have you traveled within the last 30 days?: No





- Travel/Exposure Within Last Year


Have you traveled outside the [place] in the last year?: No





- Additonal Travel Details


Have you been exposed to anyone with a communicable illness?: No





Review of Systems


Constitutional: Reports: As per HPI.  Denies: Chills, Fever, Malaise, Night 

sweats, Weakness, Weight change


Eyes: Reports: As per HPI.  Denies: Eye discharge, Eye pain, Photophobia, 

Vision change


ENT: Reports: As per HPI.  Denies: Congestion, Dental pain, Ear pain, Epistaxis

, Hearing loss, Throat pain


Respiratory: Reports: As per HPI.  Denies: Cough, Dyspnea, Hemoptysis, Stridor, 

Wheezes


Cardiovascular: Reports: As per HPI.  Denies: Arrhythmia, Chest pain, Dyspnea 

on exertion, Edema, Murmurs, Orthopnea, Palpitations, Paroxysmal nocturnal 

dyspnea, Rheumatic Fever, Syncope


Endocrine: Reports: As per HPI.  Denies: Fatigue, Heat or cold intolerance, 

Polydipsia, Polyuria


Gastrointestinal: Reports: As per HPI.  Denies: Abdominal pain, Constipation, 

Diarrhea, Hematemesis, Hematochezia, Melena, Nausea, Vomiting


Genitourinary: Reports: As per HPI.  Denies: Abnormal menses, Discharge, 

Dyspareunia, Dysuria, Frequency, Hematuria, Incontinence, Retention, Urgency


Musculoskeletal: Reports: As per HPI.  Denies: Arthralgia, Back pain, Gout, 

Joint swelling, Myalgia, Neck pain


Skin: Reports: As per HPI.  Denies: Bruising, Change in color, Change in hair/

nails, Lesions, Pruritus, Rash


Neurological: Reports: As per HPI.  Denies: Abnormal gait, Confusion, Headache, 

Numbness, Paresthesias, Seizure, Tingling, Tremors, Vertigo, Weakness


Psychiatric: Reports: As per HPI.  Denies: Anxiety, Auditory hallucinations, 

Depression, Homicidal thoughts, Suicidal thoughts, Visual hallucinations


Hematological/Lymphatic: Reports: As per HPI.  Denies: Anemia, Blood Clots, 

Easy bleeding, Easy bruising, Swollen glands





Past Medical History





- SOCIAL HISTORY


Smoking Status: Never smoker


Alcohol Use: None


Drug Use: None





- RESPIRATORY


Hx Respiratory Disorders: No


Comment:: PATIENT IS POOR HISTORIAN





- CARDIOVASCULAR


Hx Cardio Disorders: No


Comment:: PATIENT IS POOR HISTORIAN





- NEURO


Hx Neuro Disorders: No


Comment:: PATIENT IS POOR HISTORIAN





- GI


Hx GI Disorders: No


Comment:: PATIENT IS POOR HISTORIAN





- 


Hx Genitourinary Disorders: No


Comment:: PATIENT IS POOR HISTORIAN





- ENDOCRINE


Hx Endocrine Disorders: No


Comment:: PATIENT IS POOR HISTORIAN





- MUSCULOSKELETAL


Hx Musculoskeletal Disorders: No


Comment:: PATIENT IS POOR HISTORIAN





- PSYCH


Hx Psych Problems: No


Comment:: PATIENT IS POOR HISTORIAN





- HEMATOLOGY/ONCOLOGY


Hx Hematology/Oncology Disorders: Yes


Hx Cancer: Yes (colon)





Family Medical History


Any Significant Family History?: No





Physical Exam





- General


Limitations: No limitations





Course





 Vital Signs











  17





  16:59


 


Temperature 99.8 F H


 


Pulse Rate 84


 


Respiratory 20





Rate 


 


Blood Pressure 110/58


 


Pulse Ox 92 L














Medical Decision Making





- Lab Data


Result diagrams: 


 17 17:45





 17 17:45





 Lab Results











  17 Range/Units





  17:45 17:45 18:43 


 


WBC  16.2 H    (4.2-12.2)  K/uL


 


RBC  3.84    (3.80-5.40)  M/uL


 


Hgb  12.3    (11.6-16.0)  gm/dl


 


Hct  37.4    (35.0-47.0)  %


 


MCV  97.4 H    (81-97)  fl


 


MCH  32.0    (27-33)  pg


 


MCHC  32.9    (32-36)  g/dl


 


RDW  14.2    (11.5-14.5)  %


 


Plt Count  260    (130-400)  K/uL


 


MPV  10.0    (7.4-10.4)  fl


 


Lymphocytes %  4.0 L    (16-45)  %


 


Monocytes %  5.4    (0-9)  %


 


Eosinophils %  0.0    (0-6)  %


 


Basophils %  0.1    (0-6)  %


 


Sodium   137   (136-145)  mmol/L


 


Potassium   4.3   (3.5-5.1)  mmol/L


 


Chloride   100   ()  mmol/L


 


Carbon Dioxide   26.3   (22-30)  mmol/L


 


Anion Gap   10.7   (7-16)  


 


BUN   21 H   (7-17)  mg/dL


 


Creatinine   1.1 H   (0.52-1.04)  mg/dL


 


Estimated GFR   51   ml/min


 


Random Glucose   126 H   ()  mg/dL


 


Calcium   8.8   (8.5-10.1)  mg/dL


 


Total Bilirubin   1.11   (0.2-1.3)  mg/dL


 


AST   30   (14-36)  U/L


 


ALT   37   (9-52)  U/L


 


Alkaline Phosphatase   81   ()  U/L


 


Total Protein   7.0   (6.3-8.2)  gm/dL


 


Albumin   3.9   (3.5-5.0)  gm/dL


 


Globulin   3.1   (1.4-4.8)  gm/dL


 


Albumin/Globulin Ratio   1.3   (1.1-1.8)  


 


Lipase   38   ()  U/L


 


Urine Color    Yellow  


 


Urine Appearance    Cloudy  


 


Urine pH    6.5  (5.0-8.0)  


 


Ur Specific Gravity    1.025  (1.002-1.030)  


 


Urine Protein    100 mg/dl H  (NEGATIVE)  


 


Urine Glucose (UA)    Negative  (NEGATIVE)  


 


Urine Ketones    Negative  (NEGATIVE)  


 


Urine Blood    Small H  (NEGATIVE)  


 


Urine Nitrite    Positive H  (NEGATIVE)  


 


Urine Bilirubin    Negative  (NEGATIVE)  


 


Urine Urobilinogen    0.2  (0.20 - 1.00)  E.U./dL


 


Ur Leukocyte Esterase    Large H  (NEGATIVE)  


 


Urine RBC    0 - 2  (NONE SEEN)  


 


Urine WBC    36 - 50  (0-2/hpf)  


 


Ur Epithelial Cells    3 - 6  (FEW)  


 


Urine Bacteria    1+  














Disposition


Disposition: Admit


Clinical Impression: 


 Pyelonephritis





Pneumonia


Qualifiers:


 Pneumonia type: due to unspecified organism Laterality: left Lung location: 

lower lobe of lung Qualified Code(s): J18.1 - Lobar pneumonia, unspecified 

organism





Disposition: Still a Patient at [facility]


Decision to Admit: Admit from ER


Decision to Admit Date: 17


Decision to Admit Time: 19:18


Forms:  Patient Portal Access





Quality





- Quality Measures


Quality Measures: N/A





- Blood Pressure Screening


Blood Pressure Classification: Normal BP Reading


Systolic Measurement: 110


Diastolic Measurement: 58


Screening for High Blood Pressure: < Normal BP, F/U Not Required > []


Normal BP Follow-up Interventions: No follow-up required

## 2017-08-07 RX ADMIN — DEXTROSE MONOHYDRATE SCH MLS/HR: 5 INJECTION, SOLUTION INTRAVENOUS at 09:04

## 2017-08-07 RX ADMIN — DIGOXIN SCH MCG: 125 TABLET ORAL at 09:48

## 2017-08-07 RX ADMIN — ATENOLOL SCH MG: 50 TABLET ORAL at 09:48

## 2017-08-07 RX ADMIN — SIMVASTATIN SCH MG: 10 TABLET, FILM COATED ORAL at 21:27

## 2017-08-07 NOTE — RADIOLOGY REPORT
EXAM:  CHEST, TWO VIEWS



HISTORY:  FEVER AND WEAKNESS.  DIZZINESS AND LIGHTHEADEDNESS.



TECHNIQUE:  AP and lateral semi-upright views of the chest were obtained.  



Comparison:  5/01/17.  



FINDINGS:  The heart is mildly enlarged, but stable.  The mediastinum and 
pulmonary vasculature appear within normal limits.  There is focal air space 
opacity within the left lower lobe consistent with pneumonia.  There is minor 
atelectasis or infiltrate at the right lung base.  The upper lung fields are 
clear.  Degenerative changes are present within the spine and shoulders.  



IMPRESSION:  

1.  LEFT LOWER LOBE INFILTRATE CONSISTENT WITH PNEUMONIA.  



2.  MINOR ATELECTASIS OR INFILTRATE AT THE RIGHT LUNG BASE.  



JOB NUMBER:  [id number]
MTDD

## 2017-08-08 RX ADMIN — DEXTROSE MONOHYDRATE SCH MLS/HR: 5 INJECTION, SOLUTION INTRAVENOUS at 08:08

## 2017-08-08 RX ADMIN — ENOXAPARIN SODIUM SCH MG: 30 INJECTION SUBCUTANEOUS at 10:28

## 2017-08-08 RX ADMIN — ATENOLOL SCH MG: 50 TABLET ORAL at 10:28

## 2017-08-08 RX ADMIN — DIGOXIN SCH MCG: 125 TABLET ORAL at 10:28

## 2017-08-08 RX ADMIN — SIMVASTATIN SCH MG: 10 TABLET, FILM COATED ORAL at 21:30

## 2017-08-08 NOTE — HISTORY AND PHYSICAL REPORT
CHIEF COMPLAINT: Nausea, vomiting, and diarrhea. 



HISTORY OF PRESENT ILLNESS: This 81-year-old female presented to the emergency 
department with weakness, nausea, vomiting, diarrhea, and fever. Came in, seen 
by Dr. Anders, admitted to the hospital with a diagnosis of acute pyelonephritis 
and left lower lobe pneumonia. The patient did not have any coughing or 
difficulty breathing; however, the chest x-ray showed an infiltrate seen best 
on the lateral view of the chest x-ray.  



PAST MEDICAL HISTORY: Hypertension, hypercholesterolemia, colon cancer, also 
had coronary artery disease in .  



PAST SURGICAL HISTORY: Resection of the colon for colon cancer more than 5 
years ago. 



MEDICATIONS: 

1. Pravastatin 10 mg daily. 

2. Potassium chloride 10 mEq daily. 

3. Lasix 20 mg daily. 

4. Vitamin D 50,000 units weekly. 

5. Digoxin 125 mcg daily. 

6. Atenolol 50 mg daily. 

7. Possibly doxycycline 100 mg q.12 h. 



ALLERGIES: KEFLEX. 



PRIMARY DOCTOR: Dr. Morel  



FAMILY PSYCHOSOCIAL HISTORY: Unremarkable and unobtainable. Never smoked. No 
alcohol or drug use. 



REVIEW OF SYSTEMS: HEENT: No cough, cold, or congestion but she has a fever, 
nausea, vomiting, and diarrhea. Cardiovascular: History of an MI in . Has 
not seen a cardiologist in a while cardiologist . Respiratory: No cough, 
cold, or congestion. See Chief Complaint though she does have pneumonia by 
chest x-ray. Gastrointestinal: There is nausea, vomiting, and diarrhea. 
Genitourinary: Her urine shows bacteria and WBCs consistent with 
pyelonephritis. No dysuria, hematuria, or frequency or burning on urination. 
Musculoskeletal: Moving all 4 extremities. No arthritis. Neurological: No CVA, 
paralysis, or paresthesias. Endocrine: No diabetes or thyroid disease. No lumps 
in her breasts or abnormal vaginal bleeding. Integument: No rash, ulcerative 
change in moles, or yellow skin. 



PHYSICAL EXAMINATION: 

VITAL SIGNS: Height 5 feet 5 inches, weight 180 pounds, temperature 98.3, pulse 
72, blood pressure 94/55, respiratory rate 16, pulse ox 96% on room air. 

HEENT: Throat is clear. Pupils are equal, round, and reactive to light and 
accommodation. Extraocular muscles intact. Tympanic membranes are benign and 
gray. No oral lesions.  

NECK: Supple. No jugular venous distention or hepatojugular reflux. No carotid 
bruits. Thyroid is smooth. 

CARDIOVASCULAR: Regular rate and rhythm without murmurs, clicks, rubs, or 
gallops. 

RESPIRATORY: Clear to auscultation and percussion. There are some rales in the 
posterior bases bilaterally, more so on the left. I heard some on the right 
too.  

ABDOMEN: Soft, nontender. No hepatosplenomegaly. No masses, no tenderness. 
Bowel sounds active. No bruits. 

EXTREMITIES: No pitting edema. No cyanosis. No clubbing. Full range of motion. 
Peripheral pulses good. 

BREASTS/GYNECOLOGICAL/RECTAL. Exams deferred. 

GENITALIA: Normal female genitalia. 

NEUROLOGIC: Cranial nerves II-XII intact. No gross defects. Sensation normal. 
Strength normal. Deep tendon reflexes equal bilaterally. Babinski negative. She 
is a very poor historian. 

MENTAL STATUS: Alert and oriented x3. 



IMPRESSION: 

1. Pneumonia, left lower lobe. 

2. Acute pyelonephritis. 

3. History of hypertension. 

4. History of hypercholesterolemia. 

5. History of coronary artery disease. 



PLAN: IV Levaquin 750 once a day. She started out with Cipro in the emergency 
department but because of the pneumonia and urinary tract infection, I chose to 
go with Levaquin today. 



INPATIENT CERTIFICATION:  Admit to Inpatient Care.  Based on my medical 
assessment and after consideration of patient risk factors, age, comorbidities,
  and patient presenting symptoms on Acute, I expect this patient will remain 
in the hospital greater than or equal to 2 midnights, and the services needed 
warrant inpatient care because of pneumonia and pyelonephritis, and the patient'
s age and comorbidity.



ESTIMATED LENGTH OF STAY:  4 days.  The patient may reasonably be expected to 
be discharged or transferred to a hospital within 96 hours after admission to 
[facility].



I certify that my determination is in accordance with my understanding of 
Medicare requirements for reasonable and necessary inpatient services.
RICKY

## 2017-08-09 RX ADMIN — ENOXAPARIN SODIUM SCH: 30 INJECTION SUBCUTANEOUS at 09:44

## 2017-08-09 RX ADMIN — DIGOXIN SCH MCG: 125 TABLET ORAL at 09:48

## 2017-08-09 RX ADMIN — ATENOLOL SCH MG: 50 TABLET ORAL at 09:48

## 2017-08-09 RX ADMIN — DEXTROSE MONOHYDRATE SCH MLS/HR: 5 INJECTION, SOLUTION INTRAVENOUS at 07:50

## 2017-08-09 NOTE — DISCHARGE NOTE
VTE H&P Assessment





- Risk for VTE


Risk for VTE: Yes


Risk Level: Moderate


Risk Assessment Date: 08/08/17


Risk Assessment Time: 08:00


VTE Orders Placed or Will Be Placed: Yes





Discharge Medications





- Discharge Medications


Prescriptions: 


Levofloxacin [Levaquin Tab] 500 mg PO DAILY #7 tab


Home Medications: 


 Ambulatory Orders





Atenolol [Tenormin] 50 mg PO DAILY 04/30/17 [Last Taken 04/29/17]


Digoxin [Lanoxin] 125 mcg PO DAILY 04/30/17 [Last Taken 04/29/17]


Ergocalciferol (Vitamin D2) [Vitamin D2] 50,000 unit PO WEEKLY 04/30/17 [Last 

Taken Unknown]


Furosemide [Lasix] 20 mg PO DAILY 04/30/17 [Last Taken 04/29/17]


Potassium Chloride [K-Tab ER] 10 meq PO DAILY 04/30/17 [Last Taken 04/29/17]


Pravastatin Sodium [Pravachol] 10 mg PO DAILY 04/30/17 [Last Taken 04/29/17]


Levofloxacin [Levaquin Tab] 500 mg PO DAILY #7 tab 08/09/17 [Last Taken Unknown]











Discharge Note





- Date


Date of Discharge Note: 08/09/17


Condition: (1) Good


Additional Instructions: 


follow up with dr. Morel in 5-19 days


Have Dr. Morel order a follow up chest xray in 4 weeks to make sure infiltates

/ pneumonia  resolve.


Prescriptions: 


Levofloxacin [Levaquin Tab] 500 mg PO DAILY #7 tab


Forms:  Patient Portal Access

## 2017-08-10 NOTE — DISCHARGE SUMMARY
DATE OF ADMISSION: 08/06/2017



DATE OF DISCHARGE:  08/09/2017



DISCHARGE DIAGNOSES:

1.  Pneumonia, left lower lobe. 

2.  Possible right lower lobe pneumonia. 

3.  Initially she was thought to have a urinary tract infection, but that turned out to be mixed 
nuria in the urine culture. 

4.  Status post hypertension. 

5.  Status post hypercholesterolemia. 

6.  Status post coronary artery disease. 



ATTENDING PHYSICIAN:  Peter Galvan DO



REASON FOR HOSPITALIZATION:  Nausea, vomiting, and diarrhea.  This 81-year-old female presented to 
the Emergency Department with weakness, nausea, vomiting, diarrhea, and a fever.  She was seen by 
Dr. Anders and admitted to the hospital with the diagnosis of acute pyelonephritis and left lower 
lobe pneumonia.  The patient did not have any coughing or difficulty breathing; however, chest x-ray 
showed an infiltrate seen best on the lateral view of the chest x-ray, and some infiltrate in the 
right lower lobe too.  See x-ray report. 



SIGNIFICANT FINDINGS FROM EXAMINATION:  Chest x-ray showed left lower lobe infiltrate consistent 
with pneumonia and minor atelectasis or infiltrate at the right lung base.  Initially, the UA showed 
 36-50 wbc's, 0-2 rbc's, large leukocyte esterase with 1+ bacteria, but 3-6 epithelial cells.  She 
has positive nitrites and cloudy urine.  The white cell count was 16.200.  Hemoglobin was 12.3.  BUN 
was 21.  Creatinine was 1.1.  Her urine culture revealed only mixed nuria, which indicates not a 
clean-catch urine. 



THERAPY PROVIDED:  Initially, she was started on Cipro 400 mg IV every 12 hours, switched over the 
next morning to Levaquin because of her pneumonia 750 IV daily.  She gradually improved and is 
feeling much better.  Her breathing is better.  She is able to ambulate around the room without 
difficulties.  She is eating and drinking and feeling much better. 



HOSPITAL COURSE:  She gradually improved. 



CONDITION AT DISCHARGE:  Much improved. 



DISCHARGE INSTRUCTIONS:  Follow up with Dr. Morel in 5-10 days.  Discharge medications/antibiotics, 
Levaquin 500 mg daily for 7 days.  She will need a followup chest x-ray in 3-4 weeks to make sure 
that the infiltrates have totally resolved.  Activity as tolerated.  Will continue her home 
mediations of atenolol 50 mg daily, potassium chloride 10 mEq daily.  Lasix, we will restart that 
back up at 20 mg daily.  Vitamin D 50,000 units weekly.  Digoxin 125 mcg daily.  Pravachol 10 mg 
daily. CC: MD JAYCE CabralD

## 2019-12-02 ENCOUNTER — HOSPITAL ENCOUNTER (EMERGENCY)
Dept: HOSPITAL 59 - ER | Age: 84
Discharge: HOME | End: 2019-12-02
Payer: MEDICARE

## 2019-12-02 DIAGNOSIS — R42: ICD-10-CM

## 2019-12-02 DIAGNOSIS — R53.1: Primary | ICD-10-CM

## 2019-12-02 DIAGNOSIS — I10: ICD-10-CM

## 2019-12-02 LAB
ABSOLUTE NEUTROPHIL COUNT: 4.63
ALBUMIN SERPL-MCNC: 3.9 G/DL (ref 4–5)
ALBUMIN/GLOB SERPL: 1.1 {RATIO} (ref 1.1–1.8)
ALP SERPL-CCNC: 113 U/L (ref 35–104)
ALT SERPL-CCNC: 11 U/L (ref ?–33)
ANION GAP SERPL CALC-SCNC: 14 MMOL/L (ref 7–16)
APPEARANCE UR: (no result)
APTT BLD: 29.2 SECONDS (ref 24.5–39.1)
AST SERPL-CCNC: 23 U/L (ref 10–35)
BACTERIA #/AREA URNS HPF: (no result) /[HPF]
BASOPHILS NFR BLD: 0.2 % (ref 0–6)
BILIRUB SERPL-MCNC: 0.6 MG/DL (ref 0.2–1)
BILIRUB UR-MCNC: NEGATIVE MG/DL
BUN SERPL-MCNC: 18 MG/DL (ref 8–23)
CO2 SERPL-SCNC: 25 MMOL/L (ref 22–29)
COLOR UR: YELLOW
CREAT SERPL-MCNC: 1.2 MG/DL (ref 0.5–0.9)
DIGOXIN SERPL-MCNC: < 0.3 NG/ML (ref 0.8–2)
EOSINOPHIL NFR BLD: 1.8 % (ref 0–6)
EPI CELLS #/AREA URNS HPF: (no result) /[HPF]
ERYTHROCYTE [DISTWIDTH] IN BLOOD BY AUTOMATED COUNT: 15.5 % (ref 11.5–14.5)
EST GLOMERULAR FILTRATION RATE: 46 ML/MIN
GLOBULIN SER-MCNC: 3.6 GM/DL (ref 1.4–4.8)
GLUCOSE SERPL-MCNC: 130 MG/DL (ref 74–109)
GLUCOSE UR STRIP-MCNC: NEGATIVE MG/DL
GRANULOCYTES NFR BLD: 70.8 % (ref 47–80)
HCT VFR BLD CALC: 37.6 % (ref 35–47)
HGB BLD-MCNC: 11.8 GM/DL (ref 11.6–16)
INR PPP: 1
KETONES UR QL STRIP: NEGATIVE
LYMPHOCYTES NFR BLD AUTO: 17 % (ref 16–45)
MCH RBC QN AUTO: 32.8 PG (ref 27–33)
MCHC RBC AUTO-ENTMCNC: 31.4 G/DL (ref 32–36)
MCV RBC AUTO: 104.7 FL (ref 81–97)
MONOCYTES NFR BLD: 10.2 % (ref 0–9)
NITRITE UR QL STRIP: NEGATIVE
PLATELET # BLD: 285 K/UL (ref 130–400)
PMV BLD AUTO: 9.2 FL (ref 7.4–10.4)
PROT SERPL-MCNC: 7.5 G/DL (ref 6.6–8.7)
PROT UR QL STRIP: (no result)
PROTHROMBIN TIME: 10.3 SECONDS (ref 9.5–12.1)
RBC # BLD AUTO: 3.59 M/UL (ref 3.8–5.4)
RBC # UR STRIP: (no result) /UL
RBC #/AREA URNS HPF: (no result) /[HPF]
TSH SERPL-ACNC: 4.18 UIU/ML (ref 0.27–4.2)
URINE LEUKOCYTE ESTERASE: (no result)
UROBILINOGEN UR STRIP-ACNC: 0.2 E.U./DL (ref 0.2–1)
WBC # BLD AUTO: 6.5 K/UL (ref 4.2–12.2)

## 2019-12-02 PROCEDURE — 96361 HYDRATE IV INFUSION ADD-ON: CPT

## 2019-12-02 PROCEDURE — 84443 ASSAY THYROID STIM HORMONE: CPT

## 2019-12-02 PROCEDURE — 80162 ASSAY OF DIGOXIN TOTAL: CPT

## 2019-12-02 PROCEDURE — 85610 PROTHROMBIN TIME: CPT

## 2019-12-02 PROCEDURE — 85025 COMPLETE CBC W/AUTO DIFF WBC: CPT

## 2019-12-02 PROCEDURE — 99284 EMERGENCY DEPT VISIT MOD MDM: CPT

## 2019-12-02 PROCEDURE — 96360 HYDRATION IV INFUSION INIT: CPT

## 2019-12-02 PROCEDURE — 93005 ELECTROCARDIOGRAM TRACING: CPT

## 2019-12-02 PROCEDURE — 84484 ASSAY OF TROPONIN QUANT: CPT

## 2019-12-02 PROCEDURE — 71046 X-RAY EXAM CHEST 2 VIEWS: CPT

## 2019-12-02 PROCEDURE — 85730 THROMBOPLASTIN TIME PARTIAL: CPT

## 2019-12-02 PROCEDURE — 70450 CT HEAD/BRAIN W/O DYE: CPT

## 2019-12-02 PROCEDURE — 80053 COMPREHEN METABOLIC PANEL: CPT

## 2019-12-02 PROCEDURE — 81001 URINALYSIS AUTO W/SCOPE: CPT

## 2019-12-02 PROCEDURE — 93010 ELECTROCARDIOGRAM REPORT: CPT

## 2019-12-02 NOTE — CT SCAN REPORT
EXAMINATION: CT Head without IV Contrast

EXAM DATE:  12/2/2019 1:59 PM  



TECHNIQUE: Standard protocol CT images of the head were obtained without intravenous contrast. Corona
l and sagittal reconstructed images were created.



INDICATION:  weakness

COMPARISON:  None



HAND DOMINANCE:  Unknown.

ENCOUNTER: Not applicable

_________________________



FINDINGS:



1.  There is no intracranial mass, midline shift, extraaxial fluid collection or hemorrhage.

2.  Mild global cortical volume loss with ex vacuo dilatation of the ventricular system.

3.  Chronic small vessel ischemic changes in a periventricular and subcortical distribution. Intracra
nial vascular calcifications are present.

4.  There is no fracture.

5.  The visualized aspects of the orbits, paranasal sinuses, and mastoid air cells are normal.

___________________________



IMPRESSION:



No acute intracranial abnormality. Chronic changes as above.



Dictated by: William Avila MD on 12/2/2019 2:13 PM.

Electronically signed by: William Avila MD on 12/2/2019 2:15 PM. OK for the patient to have 4 samples of Advair 250/50.

## 2019-12-02 NOTE — RADIOLOGY REPORT
EXAMINATION: Two View Chest Radiographs

EXAM DATE:  12/2/2019 1:08 PM



TECHNIQUE:  Frontal and lateral views



INDICATION:  weakness

COMPARISON:  Chest radiograph 8/6/2017



ENCOUNTER: Not applicable

_________________________



FINDINGS:



The heart and mediastinal contour are stable. The lung is hyperinflated. Chronic perihilar interstiti
al markings are unchanged. There is no pulmonary consolidation. No pleural effusion or pneumothorax i
s seen. The osseous structures and overlying soft tissue markings are within normal limits.

_________________________



IMPRESSION:



No acute cardiopulmonary disease is present.



Dictated by: Kyrie Suarez DO on 12/2/2019 1:33 PM.

Electronically signed by: Kyrie Suarez DO on 12/2/2019 1:34 PM.

## 2019-12-02 NOTE — EMERGENCY DEPARTMENT RECORD
History of Present Illness





- General


Chief complaint: Weakness


Stated complaint: WEAKNESS


Time Seen by Provider: 19 12:07


Source: Patient


Mode of Arrival: Ambulatory


Limitations: No limitations





- History of Present Illness


Initial comments: 


The patient is here due to feeling weak today. She mainly is getting the 

symptoms when she stands up and has the feeling of lightheadedness. The onset 

was with waking up today. The patient denies any CP, SOB, MORRIS, sweating or 

nausea. She is on multiple medicines but is not sure when she last took them. 

There has been no hx of any recent illnesses, injuries, fever, cough, AP or 

dysuria.





MD Complaint: Generalized weakness


Onset/Timin


-: Days(s)


Location: Generalized


Improves with: None


Worsens with: Movement


Associated Symptoms: Denies other symptoms





- Brian Coma Scale


Eye Response: (4) Open spontaneously


Motor Response: (6) Obeys commands


Verbal Response: (5) Oriented


Brian Total: 15





- Related Data


                                  Previous Rx's











 Medication  Instructions  Recorded


 


Nitrofurantoin Mono [Macrobid] 100 mg PO BID #10 capsule 19











                                    Allergies











Allergy/AdvReac Type Severity Reaction Status Date / Time


 


cephalexin monohydrate Allergy  HIVES Verified 19 12:01





[From Keflex]     














Travel Screening





- Travel/Exposure Within Last 30 Days


Have you traveled within the last 30 days?: No





Review of Systems


Constitutional: Denies: Chills, Fever


Eyes: Denies: Eye discharge


ENT: Denies: Throat pain


Respiratory: Denies: Cough


Cardiovascular: Denies: Arrhythmia, Chest pain


Endocrine: Reports: Fatigue


Gastrointestinal: Denies: Nausea


Genitourinary: Denies: Dysuria


Musculoskeletal: Denies: Arthralgia


Skin: Denies: Bruising





Past Medical History





- SOCIAL HISTORY


Smoking Status: Never smoker


Alcohol Use: None


Drug Use: None





- RESPIRATORY


Hx Respiratory Disorders: No


Comment:: PATIENT IS POOR HISTORIAN





- CARDIOVASCULAR


Hx Cardio Disorders: Yes


Hx Irregular Heartbeat: Yes (palpitations)





- NEURO


Hx Neuro Disorders: No


Comment:: PATIENT IS POOR HISTORIAN





- GI


Hx GI Disorders: No


Comment:: PATIENT IS POOR HISTORIAN





- 


Hx Genitourinary Disorders: No


Comment:: PATIENT IS POOR HISTORIAN





- ENDOCRINE


Hx Endocrine Disorders: No


Comment:: PATIENT IS POOR HISTORIAN





- MUSCULOSKELETAL


Hx Musculoskeletal Disorders: No


Comment:: PATIENT IS POOR HISTORIAN





- PSYCH


Hx Psych Problems: No


Comment:: PATIENT IS POOR HISTORIAN





- HEMATOLOGY/ONCOLOGY


Hx Hematology/Oncology Disorders: Yes


Hx Cancer: Yes (colon)





Family Medical History


Any Significant Family History?: No





Physical Exam





- General


General Appearance: Alert, Oriented x3, Cooperative, No acute distress





- Head


Head exam: Atraumatic, Normocephalic, Normal inspection





- Eye


Eye exam: Normal appearance, PERRL, EOMI





- ENT


Throat exam: Normal inspection.  negative: Tonsillar erythema, Tonsillar exudate





- Neck


Neck exam: Normal inspection, Full ROM.  negative: Tenderness





- Respiratory


Respiratory exam: Normal lung sounds bilaterally.  negative: Respiratory 

distress





- Cardiovascular


Cardiovascular Exam: Regular rate, Normal rhythm, Normal heart sounds





- GI/Abdominal


GI/Abdominal exam: Soft, Normal bowel sounds.  negative: Tenderness





- Extremities


Extremities exam: Normal inspection, Full ROM, Normal capillary refill.  

negative: Tenderness





- Back


Back exam: Reports: Normal inspection





- Neurological


Neurological exam: Alert, Normal gait, Oriented X3.  negative: Abnormal gait, 

Altered, Motor sensory deficit





- Psychiatric


Psychiatric exam: negative: Anxious, Depressed





Course





                                   Vital Signs











  19





  11:58


 


Temperature 97.8 F


 


Pulse Rate 112 H


 


Respiratory 18





Rate 


 


Blood Pressure 160/76


 


Pulse Ox 99














- Reevaluation(s)


Reevaluation #1: 


The patient is doing a lot better at this time. She is up walking with no 

weakness or lightheadedness. She is feeling back to normal. 





2nd EKG: NSR at 83, neg for ischemic changes. Normal EKG for patient.


19 13:33





Reevaluation #2: 


The patient is doing very well at this time. She remains asymptomatic since we 

did giver the patient her normal dose of Betablockers. She has been up walking 

without difficulty. I did discuss the workup with the patient and daughter and 

did discuss the neg lab work and xrays. She is to see her PCP later this week 

for recheck and to return to the ER for any worsening symptoms.


19 15:15








Medical Decision Making





- Data Complexity


MDM Data: Labs Ordered and/or Reviewed, X-Ray Ordered and/or Reviewed, EKG 

Ordered and/or Reviewed





- Lab Data


Result diagrams: 


                                 19 12:05





                                 19 12:05





- EKG Data


-: EKG Interpreted by Me


EKG: No Acute Changes, Unchanged From Previous





- Radiology Data


Radiology results: Report reviewed (CXR: Neg per Rad.   Head CT: Neg for acute 

changes.)





Disposition


Disposition: Discharge


Clinical Impression: 


 Generalized weakness





Disposition: Home, Self-Care


Condition: (2) Stable


Instructions:  Weakness (ED)


Additional Instructions: 


Please continue your regular medicines and please take the Macrobid as directed.

Please see your primary doctor later this week for recheck and return to the ER 

for any worsening symptoms.


Prescriptions: 


Nitrofurantoin Mono [Macrobid] 100 mg PO BID #10 capsule


Forms:  Patient Portal Access


Time of Disposition: 15:14





Quality





- Quality Measures


Quality Measures: N/A





- Blood Pressure Screening


View Details: Yes


Does Patient Have Any of the Following: Active Dx of HTN


Blood Pressure Classification: Hypertensive Reading


Systolic Measurement: 160


Diastolic Measurement: 76


Screening for High Blood Pressure: Patient Exclusion, Hx of HTN []
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
